# Patient Record
Sex: MALE | Race: WHITE | NOT HISPANIC OR LATINO | Employment: FULL TIME | URBAN - NONMETROPOLITAN AREA
[De-identification: names, ages, dates, MRNs, and addresses within clinical notes are randomized per-mention and may not be internally consistent; named-entity substitution may affect disease eponyms.]

---

## 2024-03-12 ENCOUNTER — OFFICE VISIT (OUTPATIENT)
Dept: URGENT CARE | Facility: CLINIC | Age: 54
End: 2024-03-12
Payer: COMMERCIAL

## 2024-03-12 VITALS — HEIGHT: 72 IN | WEIGHT: 212 LBS | BODY MASS INDEX: 28.71 KG/M2

## 2024-03-12 DIAGNOSIS — K64.9 HEMORRHOIDS, UNSPECIFIED HEMORRHOID TYPE: ICD-10-CM

## 2024-03-12 DIAGNOSIS — K61.2 ANORECTAL ABSCESS: Primary | ICD-10-CM

## 2024-03-12 PROCEDURE — S9088 SERVICES PROVIDED IN URGENT: HCPCS

## 2024-03-12 PROCEDURE — 99213 OFFICE O/P EST LOW 20 MIN: CPT

## 2024-03-12 RX ORDER — TIRZEPATIDE 7.5 MG/.5ML
7.5 INJECTION, SOLUTION SUBCUTANEOUS
Status: ON HOLD | COMMUNITY
Start: 2024-02-16

## 2024-03-12 RX ORDER — PEN NEEDLE, DIABETIC 32GX 5/32"
NEEDLE, DISPOSABLE MISCELLANEOUS
Status: ON HOLD | COMMUNITY
Start: 2023-12-24

## 2024-03-12 RX ORDER — ATORVASTATIN CALCIUM 10 MG/1
10 TABLET, FILM COATED ORAL
Status: ON HOLD | COMMUNITY

## 2024-03-12 RX ORDER — AMLODIPINE BESYLATE 10 MG/1
1 TABLET ORAL DAILY
Status: ON HOLD | COMMUNITY
Start: 2024-02-16

## 2024-03-12 RX ORDER — SULFAMETHOXAZOLE AND TRIMETHOPRIM 800; 160 MG/1; MG/1
1 TABLET ORAL EVERY 12 HOURS SCHEDULED
Qty: 14 TABLET | Refills: 0 | Status: SHIPPED | OUTPATIENT
Start: 2024-03-12 | End: 2024-03-15

## 2024-03-12 RX ORDER — FINERENONE 10 MG/1
1 TABLET, FILM COATED ORAL DAILY
Status: ON HOLD | COMMUNITY

## 2024-03-12 RX ORDER — PIOGLITAZONEHYDROCHLORIDE 30 MG/1
1 TABLET ORAL DAILY
Status: ON HOLD | COMMUNITY
Start: 2023-10-05

## 2024-03-12 RX ORDER — LOSARTAN POTASSIUM 100 MG/1
100 TABLET ORAL DAILY
Status: ON HOLD | COMMUNITY
Start: 2024-02-19

## 2024-03-12 NOTE — PROGRESS NOTES
[facility]        NAME: Nate Todd is a 53 y.o. male  : 1970    MRN: [id number]  DATE: 2024  TIME: 12:19 PM    Assessment and Plan   Anorectal abscess [K61.2]  1. Anorectal abscess  sulfamethoxazole-trimethoprim (BACTRIM DS) 800-160 mg per tablet      2. Hemorrhoids, unspecified hemorrhoid type  hydrocortisone-pramoxine (PROCTOFOAM-HC) 1-1 % FOAM rectal foam    Ambulatory Referral to Colorectal Surgery        Area at rectum appears to be abscess so we will start on antibiotics to fight infection.  Other symptoms seem consistent with internal hemorrhoids so we will also prescribe Proctofoam.  Gave referral to colorectal for removal and follow up.  Went over signs and symptoms of when to proceed to ER.  Advised to increase fiber in diet, plenty of fluids, can use donut to sit for relief, and warm sitz bath. Patient and wife verbalized understanding.    Patient Instructions     Take antibiotic as prescribed   Use cream as needed  Follow up with colorectal   Can use donut to sit on   Warm compress/sitz bath   High fiber diet  Follow up with PCP in 3-5 days.  Proceed to  ER if symptoms worsen.    If tests are performed, our office will contact you with results only if changes need to made to the care plan discussed with you at the visit. You can review your full results on [facility].    Chief Complaint     Chief Complaint   Patient presents with   • abcess     Has a red, raised area near rectum. Stated it is a size of an egg         History of Present Illness       Patient is presenting for a red, raised area near his rectum. He stated it is the size of an egg.  He stated it has been going on for about a week and prior to that he was constipated.  He stated about a week ago he had some bright red blood when he wiped but no more blood.  He denies any drainage from the site.  He stated he feels that when he goes to the bathroom and has some pain with sitting on it.  He denies  any fevers.      Review of Systems   Review of Systems   Constitutional: Negative.    Respiratory: Negative.     Cardiovascular: Negative.    Skin:  Positive for color change (red, tender, swollen lump on R side of rectum).         Current Medications       Current Outpatient Medications:   •  amLODIPine (NORVASC) 10 mg tablet, Take 1 tablet by mouth daily, Disp: , Rfl:   •  atorvastatin (Lipitor) 10 mg tablet, Take 10 mg by mouth daily, Disp: , Rfl:   •  Droplet Pen Needles 32G X 4 MM MISC, use 1 PEN NEEDLE to inject MEDICATION subcutaneously once daily, Disp: , Rfl:   •  Empagliflozin 25 MG TABS, Take 25 mg by mouth daily, Disp: , Rfl:   •  hydrocortisone-pramoxine (PROCTOFOAM-HC) 1-1 % FOAM rectal foam, Insert 1 applicator into the rectum 2 (two) times a day, Disp: 10 g, Rfl: 0  •  Kerendia 10 MG TABS, Take 1 tablet by mouth daily, Disp: , Rfl:   •  losartan (COZAAR) 100 MG tablet, Take 100 mg by mouth daily, Disp: , Rfl:   •  metFORMIN (GLUCOPHAGE) 1000 MG tablet, Take 1 tablet by mouth 2 (two) times a day with meals, Disp: , Rfl:   •  Mounjaro 7.5 MG/0.5ML, 7.5 mg, Disp: , Rfl:   •  pioglitazone (ACTOS) 30 mg tablet, Take 1 tablet by mouth daily, Disp: , Rfl:   •  sulfamethoxazole-trimethoprim (BACTRIM DS) 800-160 mg per tablet, Take 1 tablet by mouth every 12 (twelve) hours for 7 days, Disp: 14 tablet, Rfl: 0    Current Allergies     Allergies as of 03/12/2024 - Reviewed 03/12/2024   Allergen Reaction Noted   • Latex Rash 03/30/2015   • Medical tape Rash 03/30/2015            The following portions of the patient's history were reviewed and updated as appropriate: allergies, current medications, past family history, past medical history, past social history, past surgical history and problem list.     Past Medical History:   Diagnosis Date   • Chronic kidney disease    • Diabetes mellitus (HCC)    • High cholesterol    • Hypertension        Past Surgical History:   Procedure Laterality Date   • ABDOMINAL  AORTIC ANEURYSM REPAIR     • ADENOIDECTOMY     • CHOLECYSTECTOMY     • COLON SIGMOID RESECTION     • COLOSTOMY     • HERNIA REPAIR     • REVISION COLOSTOMY     • TONSILLECTOMY         Family History   Problem Relation Age of Onset   • Heart disease Mother    • Aneurysm Mother    • Irregular heart beat Mother    • Heart disease Father          Medications have been verified.        Objective   Ht 6' (1.829 m)   Wt 96.2 kg (212 lb)   BMI 28.75 kg/m²        Physical Exam     Physical Exam  Exam conducted with a chaperone present.   Constitutional:       Appearance: Normal appearance. He is not ill-appearing or toxic-appearing.   HENT:      Head: Normocephalic.   Cardiovascular:      Rate and Rhythm: Normal rate.      Pulses: Normal pulses.   Pulmonary:      Effort: Pulmonary effort is normal.   Skin:     General: Skin is warm and dry.      Findings: Erythema (abscess at R side of rectum that is tender, red, and raised. No drainage.) present.   Neurological:      General: No focal deficit present.      Mental Status: He is alert and oriented to person, place, and time. Mental status is at baseline.

## 2024-03-12 NOTE — PATIENT INSTRUCTIONS
Take antibiotic as prescribed   Use cream as needed  Follow up with colorectal   Can use donut to sit on   Warm compress/sitz bath   High fiber diet  Follow up with PCP in 3-5 days.  Proceed to  ER if symptoms worsen.    If tests are performed, our office will contact you with results only if changes need to made to the care plan discussed with you at the visit. You can review your full results on [facility].

## 2024-03-14 ENCOUNTER — APPOINTMENT (EMERGENCY)
Dept: CT IMAGING | Facility: HOSPITAL | Age: 54
End: 2024-03-14
Payer: COMMERCIAL

## 2024-03-14 ENCOUNTER — HOSPITAL ENCOUNTER (OUTPATIENT)
Facility: HOSPITAL | Age: 54
Setting detail: OBSERVATION
Discharge: HOME HEALTH | End: 2024-03-15
Attending: EMERGENCY MEDICINE | Admitting: SURGERY
Payer: COMMERCIAL

## 2024-03-14 DIAGNOSIS — E11.59 DM TYPE 2 CAUSING VASCULAR DISEASE (HCC): ICD-10-CM

## 2024-03-14 DIAGNOSIS — I10 ESSENTIAL HYPERTENSION: ICD-10-CM

## 2024-03-14 DIAGNOSIS — K61.1 PERIRECTAL ABSCESS: Primary | ICD-10-CM

## 2024-03-14 DIAGNOSIS — N18.1 STAGE 1 CHRONIC KIDNEY DISEASE: ICD-10-CM

## 2024-03-14 DIAGNOSIS — K61.0 PERIANAL ABSCESS: ICD-10-CM

## 2024-03-14 PROBLEM — G47.33 OBSTRUCTIVE SLEEP APNEA SYNDROME: Status: ACTIVE | Noted: 2017-06-02

## 2024-03-14 PROBLEM — E78.5 HYPERLIPIDEMIA: Status: ACTIVE | Noted: 2018-08-10

## 2024-03-14 PROBLEM — D18.03 HEMANGIOMA OF LIVER: Status: ACTIVE | Noted: 2017-06-02

## 2024-03-14 PROBLEM — I73.9 PAD (PERIPHERAL ARTERY DISEASE) (HCC): Status: ACTIVE | Noted: 2022-12-19

## 2024-03-14 PROBLEM — R93.2 ABNORMAL FINDINGS ON DIAGNOSTIC IMAGING OF LIVER: Status: ACTIVE | Noted: 2024-03-14

## 2024-03-14 PROBLEM — I77.1 ILIAC ARTERY STENOSIS, RIGHT (HCC): Chronic | Status: ACTIVE | Noted: 2018-09-11

## 2024-03-14 PROBLEM — Z87.19 S/P HERNIA REPAIR: Status: ACTIVE | Noted: 2018-08-10

## 2024-03-14 PROBLEM — Z98.890 S/P HERNIA REPAIR: Status: ACTIVE | Noted: 2018-08-10

## 2024-03-14 LAB
ALBUMIN SERPL BCP-MCNC: 4.1 G/DL (ref 3.5–5)
ALP SERPL-CCNC: 91 U/L (ref 34–104)
ALT SERPL W P-5'-P-CCNC: 11 U/L (ref 7–52)
ANION GAP SERPL CALCULATED.3IONS-SCNC: 11 MMOL/L (ref 4–13)
APTT PPP: 28 SECONDS (ref 23–37)
AST SERPL W P-5'-P-CCNC: 9 U/L (ref 13–39)
BASOPHILS # BLD AUTO: 0.04 THOUSANDS/ÂΜL (ref 0–0.1)
BASOPHILS NFR BLD AUTO: 0 % (ref 0–1)
BILIRUB SERPL-MCNC: 0.52 MG/DL (ref 0.2–1)
BUN SERPL-MCNC: 17 MG/DL (ref 5–25)
CALCIUM SERPL-MCNC: 9.4 MG/DL (ref 8.4–10.2)
CHLORIDE SERPL-SCNC: 98 MMOL/L (ref 96–108)
CO2 SERPL-SCNC: 25 MMOL/L (ref 21–32)
CREAT SERPL-MCNC: 1.03 MG/DL (ref 0.6–1.3)
EOSINOPHIL # BLD AUTO: 0.08 THOUSAND/ÂΜL (ref 0–0.61)
EOSINOPHIL NFR BLD AUTO: 1 % (ref 0–6)
ERYTHROCYTE [DISTWIDTH] IN BLOOD BY AUTOMATED COUNT: 12.6 % (ref 11.6–15.1)
GFR SERPL CREATININE-BSD FRML MDRD: 81 ML/MIN/1.73SQ M
GLUCOSE SERPL-MCNC: 235 MG/DL (ref 65–140)
HCT VFR BLD AUTO: 44.4 % (ref 36.5–49.3)
HGB BLD-MCNC: 15.5 G/DL (ref 12–17)
IMM GRANULOCYTES # BLD AUTO: 0.08 THOUSAND/UL (ref 0–0.2)
IMM GRANULOCYTES NFR BLD AUTO: 1 % (ref 0–2)
INR PPP: 0.95 (ref 0.84–1.19)
LACTATE SERPL-SCNC: 1.6 MMOL/L (ref 0.5–2)
LYMPHOCYTES # BLD AUTO: 2.41 THOUSANDS/ÂΜL (ref 0.6–4.47)
LYMPHOCYTES NFR BLD AUTO: 16 % (ref 14–44)
MCH RBC QN AUTO: 30.2 PG (ref 26.8–34.3)
MCHC RBC AUTO-ENTMCNC: 34.9 G/DL (ref 31.4–37.4)
MCV RBC AUTO: 87 FL (ref 82–98)
MONOCYTES # BLD AUTO: 0.97 THOUSAND/ÂΜL (ref 0.17–1.22)
MONOCYTES NFR BLD AUTO: 7 % (ref 4–12)
NEUTROPHILS # BLD AUTO: 11.13 THOUSANDS/ÂΜL (ref 1.85–7.62)
NEUTS SEG NFR BLD AUTO: 75 % (ref 43–75)
NRBC BLD AUTO-RTO: 0 /100 WBCS
PLATELET # BLD AUTO: 289 THOUSANDS/UL (ref 149–390)
PLATELET # BLD AUTO: 303 THOUSANDS/UL (ref 149–390)
PMV BLD AUTO: 8.5 FL (ref 8.9–12.7)
PMV BLD AUTO: 8.8 FL (ref 8.9–12.7)
POTASSIUM SERPL-SCNC: 3.9 MMOL/L (ref 3.5–5.3)
PROT SERPL-MCNC: 8.4 G/DL (ref 6.4–8.4)
PROTHROMBIN TIME: 13 SECONDS (ref 11.6–14.5)
RBC # BLD AUTO: 5.13 MILLION/UL (ref 3.88–5.62)
SODIUM SERPL-SCNC: 134 MMOL/L (ref 135–147)
WBC # BLD AUTO: 14.71 THOUSAND/UL (ref 4.31–10.16)

## 2024-03-14 PROCEDURE — 99285 EMERGENCY DEPT VISIT HI MDM: CPT | Performed by: EMERGENCY MEDICINE

## 2024-03-14 PROCEDURE — 87070 CULTURE OTHR SPECIMN AEROBIC: CPT | Performed by: SURGERY

## 2024-03-14 PROCEDURE — 85730 THROMBOPLASTIN TIME PARTIAL: CPT | Performed by: EMERGENCY MEDICINE

## 2024-03-14 PROCEDURE — 96375 TX/PRO/DX INJ NEW DRUG ADDON: CPT

## 2024-03-14 PROCEDURE — 87185 SC STD ENZYME DETCJ PER NZM: CPT | Performed by: SURGERY

## 2024-03-14 PROCEDURE — 87040 BLOOD CULTURE FOR BACTERIA: CPT | Performed by: EMERGENCY MEDICINE

## 2024-03-14 PROCEDURE — 87205 SMEAR GRAM STAIN: CPT | Performed by: SURGERY

## 2024-03-14 PROCEDURE — 99222 1ST HOSP IP/OBS MODERATE 55: CPT | Performed by: SURGERY

## 2024-03-14 PROCEDURE — 74177 CT ABD & PELVIS W/CONTRAST: CPT

## 2024-03-14 PROCEDURE — 36415 COLL VENOUS BLD VENIPUNCTURE: CPT | Performed by: EMERGENCY MEDICINE

## 2024-03-14 PROCEDURE — 80053 COMPREHEN METABOLIC PANEL: CPT | Performed by: EMERGENCY MEDICINE

## 2024-03-14 PROCEDURE — 85025 COMPLETE CBC W/AUTO DIFF WBC: CPT | Performed by: EMERGENCY MEDICINE

## 2024-03-14 PROCEDURE — 96365 THER/PROPH/DIAG IV INF INIT: CPT

## 2024-03-14 PROCEDURE — 96361 HYDRATE IV INFUSION ADD-ON: CPT

## 2024-03-14 PROCEDURE — 85049 AUTOMATED PLATELET COUNT: CPT | Performed by: SURGERY

## 2024-03-14 PROCEDURE — 85610 PROTHROMBIN TIME: CPT | Performed by: EMERGENCY MEDICINE

## 2024-03-14 PROCEDURE — 46040 I&D ISCHIORCT&/PERIRCT ABSC: CPT | Performed by: SURGERY

## 2024-03-14 PROCEDURE — 83605 ASSAY OF LACTIC ACID: CPT | Performed by: EMERGENCY MEDICINE

## 2024-03-14 PROCEDURE — 99284 EMERGENCY DEPT VISIT MOD MDM: CPT

## 2024-03-14 RX ORDER — HYDROMORPHONE HCL/PF 1 MG/ML
0.5 SYRINGE (ML) INJECTION ONCE
Status: COMPLETED | OUTPATIENT
Start: 2024-03-14 | End: 2024-03-14

## 2024-03-14 RX ORDER — ATORVASTATIN CALCIUM 10 MG/1
10 TABLET, FILM COATED ORAL DAILY
Status: DISCONTINUED | OUTPATIENT
Start: 2024-03-15 | End: 2024-03-15 | Stop reason: HOSPADM

## 2024-03-14 RX ORDER — HEPARIN SODIUM 5000 [USP'U]/ML
5000 INJECTION, SOLUTION INTRAVENOUS; SUBCUTANEOUS EVERY 8 HOURS SCHEDULED
Status: DISCONTINUED | OUTPATIENT
Start: 2024-03-14 | End: 2024-03-15 | Stop reason: HOSPADM

## 2024-03-14 RX ORDER — AMLODIPINE BESYLATE 5 MG/1
10 TABLET ORAL DAILY
Status: DISCONTINUED | OUTPATIENT
Start: 2024-03-15 | End: 2024-03-15 | Stop reason: HOSPADM

## 2024-03-14 RX ORDER — OXYCODONE HYDROCHLORIDE AND ACETAMINOPHEN 5; 325 MG/1; MG/1
2 TABLET ORAL EVERY 4 HOURS PRN
Status: DISCONTINUED | OUTPATIENT
Start: 2024-03-14 | End: 2024-03-15 | Stop reason: HOSPADM

## 2024-03-14 RX ORDER — SODIUM CHLORIDE, SODIUM LACTATE, POTASSIUM CHLORIDE, CALCIUM CHLORIDE 600; 310; 30; 20 MG/100ML; MG/100ML; MG/100ML; MG/100ML
125 INJECTION, SOLUTION INTRAVENOUS CONTINUOUS
Status: DISCONTINUED | OUTPATIENT
Start: 2024-03-14 | End: 2024-03-15 | Stop reason: HOSPADM

## 2024-03-14 RX ORDER — LOSARTAN POTASSIUM 50 MG/1
100 TABLET ORAL DAILY
Status: DISCONTINUED | OUTPATIENT
Start: 2024-03-15 | End: 2024-03-15 | Stop reason: HOSPADM

## 2024-03-14 RX ORDER — LIDOCAINE HYDROCHLORIDE AND EPINEPHRINE 10; 10 MG/ML; UG/ML
60 INJECTION, SOLUTION INFILTRATION; PERINEURAL ONCE
Status: COMPLETED | OUTPATIENT
Start: 2024-03-14 | End: 2024-03-14

## 2024-03-14 RX ADMIN — LIDOCAINE HYDROCHLORIDE,EPINEPHRINE BITARTRATE 60 ML: 10; .01 INJECTION, SOLUTION INFILTRATION; PERINEURAL at 21:08

## 2024-03-14 RX ADMIN — SODIUM CHLORIDE 1000 ML: 0.9 INJECTION, SOLUTION INTRAVENOUS at 17:34

## 2024-03-14 RX ADMIN — HYDROMORPHONE HYDROCHLORIDE 0.5 MG: 1 INJECTION, SOLUTION INTRAMUSCULAR; INTRAVENOUS; SUBCUTANEOUS at 20:33

## 2024-03-14 RX ADMIN — PIPERACILLIN AND TAZOBACTAM 4.5 G: 36; 4.5 INJECTION, POWDER, FOR SOLUTION INTRAVENOUS at 22:40

## 2024-03-14 RX ADMIN — SODIUM CHLORIDE, SODIUM LACTATE, POTASSIUM CHLORIDE, AND CALCIUM CHLORIDE 125 ML/HR: .6; .31; .03; .02 INJECTION, SOLUTION INTRAVENOUS at 22:41

## 2024-03-14 RX ADMIN — HEPARIN SODIUM 5000 UNITS: 5000 INJECTION INTRAVENOUS; SUBCUTANEOUS at 22:41

## 2024-03-14 RX ADMIN — IOHEXOL 100 ML: 350 INJECTION, SOLUTION INTRAVENOUS at 18:32

## 2024-03-14 RX ADMIN — OXYCODONE HYDROCHLORIDE AND ACETAMINOPHEN 2 TABLET: 5; 325 TABLET ORAL at 22:40

## 2024-03-14 RX ADMIN — PIPERACILLIN AND TAZOBACTAM 4.5 G: 36; 4.5 INJECTION, POWDER, FOR SOLUTION INTRAVENOUS at 20:00

## 2024-03-14 NOTE — Clinical Note
Case was discussed with Dr Moran and the patient's admission status was agreed to be Admission Status: inpatient status to the service of Dr. Moran .

## 2024-03-14 NOTE — ED PROVIDER NOTES
History  Chief Complaint   Patient presents with    Abscess     Pt reports having an anorectal abscess for a little over a week. Went to Urgent care and got a topical cream and antibiotics but pt is having an increase in pain     Patient is a 53-year-old male presenting to the emergency department complaining of painful erythematous swelling to his perirectal area that is been going on for the past week, he reports a fever developing today as well, was seen at urgent care 2 days ago and started on Bactrim and Proctofoam topical cream, reports worsening of symptoms, no nausea vomiting or diarrhea, no chest pain or shortness of breath, has a history of abscesses on his back previously but not in the perirectal area        Prior to Admission Medications   Prescriptions Last Dose Informant Patient Reported? Taking?   Droplet Pen Needles 32G X 4 MM MISC   Yes No   Sig: use 1 PEN NEEDLE to inject MEDICATION subcutaneously once daily   Empagliflozin 25 MG TABS   Yes No   Sig: Take 25 mg by mouth daily   Kerendia 10 MG TABS   Yes No   Sig: Take 1 tablet by mouth daily   Mounjaro 7.5 MG/0.5ML   Yes No   Si.5 mg   amLODIPine (NORVASC) 10 mg tablet   Yes No   Sig: Take 1 tablet by mouth daily   atorvastatin (Lipitor) 10 mg tablet   Yes No   Sig: Take 10 mg by mouth daily   hydrocortisone-pramoxine (PROCTOFOAM-HC) 1-1 % FOAM rectal foam   No No   Sig: Insert 1 applicator into the rectum 2 (two) times a day   losartan (COZAAR) 100 MG tablet   Yes No   Sig: Take 100 mg by mouth daily   metFORMIN (GLUCOPHAGE) 1000 MG tablet   Yes No   Sig: Take 1 tablet by mouth 2 (two) times a day with meals   pioglitazone (ACTOS) 30 mg tablet   Yes No   Sig: Take 1 tablet by mouth daily   sulfamethoxazole-trimethoprim (BACTRIM DS) 800-160 mg per tablet   No No   Sig: Take 1 tablet by mouth every 12 (twelve) hours for 7 days      Facility-Administered Medications: None       Past Medical History:   Diagnosis Date    Chronic kidney disease      Diabetes mellitus (HCC)     High cholesterol     Hypertension        Past Surgical History:   Procedure Laterality Date    ABDOMINAL AORTIC ANEURYSM REPAIR      ADENOIDECTOMY      CHOLECYSTECTOMY      COLON SIGMOID RESECTION      COLOSTOMY      HERNIA REPAIR      REVISION COLOSTOMY      TONSILLECTOMY         Family History   Problem Relation Age of Onset    Heart disease Mother     Aneurysm Mother     Irregular heart beat Mother     Heart disease Father      I have reviewed and agree with the history as documented.    E-Cigarette/Vaping     E-Cigarette/Vaping Substances     Social History     Tobacco Use    Smoking status: Every Day     Current packs/day: 1.00     Types: Cigarettes     Passive exposure: Current    Smokeless tobacco: Never   Substance Use Topics    Alcohol use: Not Currently    Drug use: Not Currently       Review of Systems   Constitutional:  Positive for chills and fever.   HENT: Negative.     Eyes: Negative.    Respiratory: Negative.     Cardiovascular: Negative.    Gastrointestinal:  Positive for rectal pain.   Endocrine: Negative.    Genitourinary: Negative.    Musculoskeletal: Negative.    Skin: Negative.    Allergic/Immunologic: Negative.    Neurological: Negative.    Hematological: Negative.    Psychiatric/Behavioral: Negative.         Physical Exam  Physical Exam  Constitutional:       Appearance: He is well-developed.   HENT:      Head: Normocephalic and atraumatic.   Eyes:      Conjunctiva/sclera: Conjunctivae normal.      Pupils: Pupils are equal, round, and reactive to light.   Cardiovascular:      Rate and Rhythm: Normal rate.   Pulmonary:      Effort: Pulmonary effort is normal.   Abdominal:      Palpations: Abdomen is soft.   Genitourinary:         Comments: Large erythematous tender indurated fluctuant abscess to the perineum  Musculoskeletal:         General: Normal range of motion.      Cervical back: Normal range of motion and neck supple.   Skin:     General: Skin is warm and  dry.   Neurological:      Mental Status: He is alert and oriented to person, place, and time.         Vital Signs  ED Triage Vitals   Temperature Pulse Respirations Blood Pressure SpO2   03/14/24 1629 03/14/24 1626 03/14/24 1626 03/14/24 1626 03/14/24 1626   100.1 °F (37.8 °C) (!) 116 18 133/73 97 %      Temp Source Heart Rate Source Patient Position - Orthostatic VS BP Location FiO2 (%)   03/14/24 1629 03/14/24 1626 03/14/24 1626 03/14/24 1626 --   Oral Monitor Sitting Right arm       Pain Score       03/14/24 2033       6           Vitals:    03/14/24 1626 03/14/24 2045   BP: 133/73 124/77   Pulse: (!) 116 100   Patient Position - Orthostatic VS: Sitting Lying         Visual Acuity      ED Medications  Medications   amLODIPine (NORVASC) tablet 10 mg (has no administration in time range)   atorvastatin (LIPITOR) tablet 10 mg (has no administration in time range)   Empagliflozin TABS 25 mg (has no administration in time range)   Finerenone TABS 1 tablet (has no administration in time range)   losartan (COZAAR) tablet 100 mg (has no administration in time range)   metFORMIN (GLUCOPHAGE) tablet 1,000 mg (has no administration in time range)   lactated ringers infusion (has no administration in time range)   heparin (porcine) subcutaneous injection 5,000 Units (has no administration in time range)   piperacillin-tazobactam (ZOSYN) 4.5 g in sodium chloride 0.9 % 100 mL IV LOADING DOSE (has no administration in time range)     And   piperacillin-tazobactam (ZOSYN) 4.5 g in sodium chloride 0.9 % 100 mL IVPB (EXTENDED INFUSION) (has no administration in time range)   oxyCODONE-acetaminophen (PERCOCET) 5-325 mg per tablet 2 tablet (has no administration in time range)   morphine injection 2 mg (has no administration in time range)   sodium chloride 0.9 % bolus 1,000 mL (0 mL Intravenous Stopped 3/14/24 1834)   HYDROmorphone (DILAUDID) injection 0.5 mg (0.5 mg Intravenous Given 3/14/24 2033)   iohexol (OMNIPAQUE) 350 MG/ML  injection (MULTI-DOSE) 100 mL (100 mL Intravenous Given 3/14/24 1832)   piperacillin-tazobactam (ZOSYN) 4.5 g in sodium chloride 0.9 % 100 mL IVPB (0 g Intravenous Stopped 3/14/24 2032)   lidocaine-epinephrine (XYLOCAINE/EPINEPHRINE) 1 %-1:100,000 injection 60 mL (60 mL Infiltration Given 3/14/24 2108)       Diagnostic Studies  Results Reviewed       Procedure Component Value Units Date/Time    Platelet count [[id number]]     Lab Status: No result Specimen: Blood     Wound culture and Gram stain [[id number]] Collected: 03/14/24 2109    Lab Status: In process Specimen: Wound from Sacrum Updated: 03/14/24 2116    Lactic acid, plasma (w/reflex if result > 2.0) [[id number]]  (Normal) Collected: 03/14/24 1730    Lab Status: Final result Specimen: Blood from Arm, Right Updated: 03/14/24 1811     LACTIC ACID 1.6 mmol/L     Narrative:      Result may be elevated if tourniquet was used during collection.    Comprehensive metabolic panel [[id number]]  (Abnormal) Collected: 03/14/24 1730    Lab Status: Final result Specimen: Blood from Arm, Right Updated: 03/14/24 1810     Sodium 134 mmol/L      Potassium 3.9 mmol/L      Chloride 98 mmol/L      CO2 25 mmol/L      ANION GAP 11 mmol/L      BUN 17 mg/dL      Creatinine 1.03 mg/dL      Glucose 235 mg/dL      Calcium 9.4 mg/dL      AST 9 U/L      ALT 11 U/L      Alkaline Phosphatase 91 U/L      Total Protein 8.4 g/dL      Albumin 4.1 g/dL      Total Bilirubin 0.52 mg/dL      eGFR 81 ml/min/1.73sq m     Narrative:      National Kidney Disease Foundation guidelines for Chronic Kidney Disease (CKD):     Stage 1 with normal or high GFR (GFR > 90 mL/min/1.73 square meters)    Stage 2 Mild CKD (GFR = 60-89 mL/min/1.73 square meters)    Stage 3A Moderate CKD (GFR = 45-59 mL/min/1.73 square meters)    Stage 3B Moderate CKD (GFR = 30-44 mL/min/1.73 square meters)    Stage 4 Severe CKD (GFR = 15-29 mL/min/1.73 square meters)    Stage 5 End Stage CKD (GFR <15 mL/min/1.73 square meters)  Note: GFR  calculation is accurate only with a steady state creatinine    Protime-INR [[id number]]  (Normal) Collected: 03/14/24 1730    Lab Status: Final result Specimen: Blood from Arm, Right Updated: 03/14/24 1801     Protime 13.0 seconds      INR 0.95    APTT [[id number]]  (Normal) Collected: 03/14/24 1730    Lab Status: Final result Specimen: Blood from Arm, Right Updated: 03/14/24 1801     PTT 28 seconds     CBC and differential [[id number]]  (Abnormal) Collected: 03/14/24 1730    Lab Status: Final result Specimen: Blood from Arm, Right Updated: 03/14/24 1741     WBC 14.71 Thousand/uL      RBC 5.13 Million/uL      Hemoglobin 15.5 g/dL      Hematocrit 44.4 %      MCV 87 fL      MCH 30.2 pg      MCHC 34.9 g/dL      RDW 12.6 %      MPV 8.5 fL      Platelets 289 Thousands/uL      nRBC 0 /100 WBCs      Neutrophils Relative 75 %      Immature Grans % 1 %      Lymphocytes Relative 16 %      Monocytes Relative 7 %      Eosinophils Relative 1 %      Basophils Relative 0 %      Neutrophils Absolute 11.13 Thousands/µL      Absolute Immature Grans 0.08 Thousand/uL      Absolute Lymphocytes 2.41 Thousands/µL      Absolute Monocytes 0.97 Thousand/µL      Eosinophils Absolute 0.08 Thousand/µL      Basophils Absolute 0.04 Thousands/µL     Blood culture #1 [[id number]] Collected: 03/14/24 1730    Lab Status: In process Specimen: Blood from Arm, Left Updated: 03/14/24 1738    Blood culture #2 [[id number]] Collected: 03/14/24 1730    Lab Status: In process Specimen: Blood from Arm, Right Updated: 03/14/24 1738                   CT abdomen pelvis with contrast   Final Result by Doroteo Thakkar MD (03/14 1923)      Perianal abscess appears to involve the right medial gluteal fold measuring 2.9 x 4.7 x 5.6 cm. Cellulitic changes are seen adjacent to the abscess.      Postsurgical changes of the abdominal aorta and common iliac arteries as described with stable appearance.      Enhancing lesion in the posterior right liver dome measuring 3.5 cm not  seen previously. Further evaluation/characterization with MRI recommended.            The study was marked in EPIC for immediate notification and MRI follow-up notification for 2 months.         Workstation performed: [id number]                    Procedures  Procedures         ED Course  ED Course as of 03/14/24 2137   Thu Mar 14, 2024   1927 CT abdomen pelvis with contrast   1947 [place] text to surgery, will come see patient                                             Medical Decision Making  Patient presents with initial presentation for local erythema, warmth and swelling concerning for perirectal abscess.  There is sensitivity/slight tenderness to light touch around the erythematous area.   Low concern for osteomyelitis or DVT.  No immune compromise, bullae, pain out of proportion, or rapid progression concerning for necrotizing fasciitis.  Patient with leukocytosis and elevated temp, discussed with surgery who came to see, performed bedside I&D and will admit for IV antibiotics and further treatment    Problems Addressed:  Perianal abscess: acute illness or injury    Amount and/or Complexity of Data Reviewed  Labs: ordered. Decision-making details documented in ED Course.  Radiology: ordered. Decision-making details documented in ED Course.    Risk  Prescription drug management.  Decision regarding hospitalization.             Disposition  Final diagnoses:   Perianal abscess     Time reflects when diagnosis was documented in both MDM as applicable and the Disposition within this note       Time User Action Codes Description Comment    3/14/2024  9:18 PM Curt Moran [K61.1] Perirectal abscess     3/14/2024  9:23 PM Curt Moran [E11.59] DM type 2 causing vascular disease (HCC)     3/14/2024  9:23 PM Curt Moran [I10] Essential hypertension     3/14/2024  9:23 PM Curt Moran [N18.1] Stage 1 chronic kidney disease     3/14/2024  9:32 PM Mari Friedman [K61.0] Perianal  abscess           ED Disposition       ED Disposition   Admit    Condition   Stable    Date/Time   Thu Mar 14, 2024  9:33 PM    Comment   Case was discussed with Dr Moran and the patient's admission status was agreed to be Observation Status: observation status to the service of Dr. Moran .               Follow-up Information    None         Patient's Medications   Discharge Prescriptions    No medications on file       No discharge procedures on file.    PDMP Review       None            ED Provider  Electronically Signed by             Mari Friedman DO  03/14/24 8705

## 2024-03-15 VITALS
HEIGHT: 72 IN | BODY MASS INDEX: 28.71 KG/M2 | SYSTOLIC BLOOD PRESSURE: 130 MMHG | OXYGEN SATURATION: 96 % | WEIGHT: 212 LBS | DIASTOLIC BLOOD PRESSURE: 76 MMHG | HEART RATE: 87 BPM | TEMPERATURE: 97.9 F | RESPIRATION RATE: 18 BRPM

## 2024-03-15 LAB
ANION GAP SERPL CALCULATED.3IONS-SCNC: 7 MMOL/L (ref 4–13)
BASOPHILS # BLD AUTO: 0.05 THOUSANDS/ÂΜL (ref 0–0.1)
BASOPHILS NFR BLD AUTO: 0 % (ref 0–1)
BUN SERPL-MCNC: 17 MG/DL (ref 5–25)
CALCIUM SERPL-MCNC: 8.4 MG/DL (ref 8.4–10.2)
CHLORIDE SERPL-SCNC: 104 MMOL/L (ref 96–108)
CO2 SERPL-SCNC: 24 MMOL/L (ref 21–32)
CREAT SERPL-MCNC: 1.04 MG/DL (ref 0.6–1.3)
EOSINOPHIL # BLD AUTO: 0.02 THOUSAND/ÂΜL (ref 0–0.61)
EOSINOPHIL NFR BLD AUTO: 0 % (ref 0–6)
ERYTHROCYTE [DISTWIDTH] IN BLOOD BY AUTOMATED COUNT: 12.7 % (ref 11.6–15.1)
GFR SERPL CREATININE-BSD FRML MDRD: 80 ML/MIN/1.73SQ M
GLUCOSE P FAST SERPL-MCNC: 200 MG/DL (ref 65–99)
GLUCOSE SERPL-MCNC: 172 MG/DL (ref 65–140)
GLUCOSE SERPL-MCNC: 200 MG/DL (ref 65–140)
GLUCOSE SERPL-MCNC: 207 MG/DL (ref 65–140)
HCT VFR BLD AUTO: 39.3 % (ref 36.5–49.3)
HGB BLD-MCNC: 13.4 G/DL (ref 12–17)
IMM GRANULOCYTES # BLD AUTO: 0.09 THOUSAND/UL (ref 0–0.2)
IMM GRANULOCYTES NFR BLD AUTO: 1 % (ref 0–2)
LYMPHOCYTES # BLD AUTO: 1.28 THOUSANDS/ÂΜL (ref 0.6–4.47)
LYMPHOCYTES NFR BLD AUTO: 7 % (ref 14–44)
MAGNESIUM SERPL-MCNC: 2 MG/DL (ref 1.9–2.7)
MCH RBC QN AUTO: 30.5 PG (ref 26.8–34.3)
MCHC RBC AUTO-ENTMCNC: 34.1 G/DL (ref 31.4–37.4)
MCV RBC AUTO: 90 FL (ref 82–98)
MONOCYTES # BLD AUTO: 1.16 THOUSAND/ÂΜL (ref 0.17–1.22)
MONOCYTES NFR BLD AUTO: 7 % (ref 4–12)
NEUTROPHILS # BLD AUTO: 14.85 THOUSANDS/ÂΜL (ref 1.85–7.62)
NEUTS SEG NFR BLD AUTO: 85 % (ref 43–75)
NRBC BLD AUTO-RTO: 0 /100 WBCS
PHOSPHATE SERPL-MCNC: 4 MG/DL (ref 2.7–4.5)
PLATELET # BLD AUTO: 220 THOUSANDS/UL (ref 149–390)
PMV BLD AUTO: 8.7 FL (ref 8.9–12.7)
POTASSIUM SERPL-SCNC: 4.3 MMOL/L (ref 3.5–5.3)
RBC # BLD AUTO: 4.39 MILLION/UL (ref 3.88–5.62)
SODIUM SERPL-SCNC: 135 MMOL/L (ref 135–147)
WBC # BLD AUTO: 17.45 THOUSAND/UL (ref 4.31–10.16)

## 2024-03-15 PROCEDURE — 84100 ASSAY OF PHOSPHORUS: CPT | Performed by: SURGERY

## 2024-03-15 PROCEDURE — 80048 BASIC METABOLIC PNL TOTAL CA: CPT | Performed by: SURGERY

## 2024-03-15 PROCEDURE — 99222 1ST HOSP IP/OBS MODERATE 55: CPT | Performed by: INTERNAL MEDICINE

## 2024-03-15 PROCEDURE — 85025 COMPLETE CBC W/AUTO DIFF WBC: CPT | Performed by: SURGERY

## 2024-03-15 PROCEDURE — 99222 1ST HOSP IP/OBS MODERATE 55: CPT

## 2024-03-15 PROCEDURE — 83735 ASSAY OF MAGNESIUM: CPT | Performed by: SURGERY

## 2024-03-15 PROCEDURE — 82948 REAGENT STRIP/BLOOD GLUCOSE: CPT

## 2024-03-15 RX ORDER — INSULIN LISPRO 100 [IU]/ML
1-6 INJECTION, SOLUTION INTRAVENOUS; SUBCUTANEOUS
Status: DISCONTINUED | OUTPATIENT
Start: 2024-03-15 | End: 2024-03-15 | Stop reason: HOSPADM

## 2024-03-15 RX ORDER — OXYCODONE HYDROCHLORIDE AND ACETAMINOPHEN 5; 325 MG/1; MG/1
2 TABLET ORAL EVERY 6 HOURS PRN
Qty: 12 TABLET | Refills: 0 | Status: SHIPPED | OUTPATIENT
Start: 2024-03-15 | End: 2024-03-27

## 2024-03-15 RX ORDER — AMOXICILLIN AND CLAVULANATE POTASSIUM 875; 125 MG/1; MG/1
1 TABLET, FILM COATED ORAL EVERY 12 HOURS SCHEDULED
Qty: 14 TABLET | Refills: 0 | Status: ON HOLD | OUTPATIENT
Start: 2024-03-15 | End: 2024-03-25

## 2024-03-15 RX ADMIN — INSULIN LISPRO 1 UNITS: 100 INJECTION, SOLUTION INTRAVENOUS; SUBCUTANEOUS at 08:49

## 2024-03-15 RX ADMIN — HEPARIN SODIUM 5000 UNITS: 5000 INJECTION INTRAVENOUS; SUBCUTANEOUS at 13:24

## 2024-03-15 RX ADMIN — LOSARTAN POTASSIUM 100 MG: 50 TABLET, FILM COATED ORAL at 08:48

## 2024-03-15 RX ADMIN — INSULIN LISPRO 2 UNITS: 100 INJECTION, SOLUTION INTRAVENOUS; SUBCUTANEOUS at 11:31

## 2024-03-15 RX ADMIN — PIPERACILLIN AND TAZOBACTAM 4.5 G: 36; 4.5 INJECTION, POWDER, FOR SOLUTION INTRAVENOUS at 02:06

## 2024-03-15 RX ADMIN — MORPHINE SULFATE 2 MG: 2 INJECTION, SOLUTION INTRAMUSCULAR; INTRAVENOUS at 14:56

## 2024-03-15 RX ADMIN — ATORVASTATIN CALCIUM 10 MG: 10 TABLET, FILM COATED ORAL at 08:48

## 2024-03-15 RX ADMIN — HEPARIN SODIUM 5000 UNITS: 5000 INJECTION INTRAVENOUS; SUBCUTANEOUS at 05:11

## 2024-03-15 RX ADMIN — AMLODIPINE BESYLATE 10 MG: 5 TABLET ORAL at 08:48

## 2024-03-15 RX ADMIN — PIPERACILLIN AND TAZOBACTAM 4.5 G: 36; 4.5 INJECTION, POWDER, FOR SOLUTION INTRAVENOUS at 11:16

## 2024-03-15 NOTE — NURSING NOTE
AVS reviewed with patient. Patient verbalized understanding on same. Wound care education provided. [facility] will be in tomorrow to do wound care. Wound care completed by provider today.

## 2024-03-15 NOTE — H&P
H&P Exam - General Surgery   Nate Todd 53 y.o. male MRN: [id number]  Unit/Bed#: ED 10 Encounter: [id number]    Assessment/Plan     Assessment:  Pleasant 53-year-old male with a past medical history significant for hypertension and type 2 diabetes mellitus presenting with a perirectal abscess for which incision and drainage is now indicated.    Plan:  Technical details of bedside incision and drainage of perirectal abscess reviewed and informed verbal consent obtained to proceed.    The incidental finding of a liver lesion was noted with the patient and wife voiced understanding that additional testing following discharge will be necessary.    History of Present Illness   HPI:  Nate Todd is a 53 y.o. male who presents with perirectal abscess.    Review of Systems   Constitutional:  Negative for chills and fever.   HENT:  Negative for ear pain and sore throat.    Eyes:  Negative for pain and visual disturbance.   Respiratory:  Negative for cough and shortness of breath.    Cardiovascular:  Negative for chest pain and palpitations.   Gastrointestinal:  Negative for abdominal pain and vomiting.   Genitourinary:  Negative for dysuria and hematuria.   Musculoskeletal:  Negative for arthralgias and back pain.   Skin:  Negative for color change and rash.   Neurological:  Negative for seizures and syncope.   All other systems reviewed and are negative.      Historical Information   Past Medical History:   Diagnosis Date    Chronic kidney disease     Diabetes mellitus (HCC)     High cholesterol     Hypertension      Past Surgical History:   Procedure Laterality Date    ABDOMINAL AORTIC ANEURYSM REPAIR      ADENOIDECTOMY      CHOLECYSTECTOMY      COLON SIGMOID RESECTION      COLOSTOMY      HERNIA REPAIR      REVISION COLOSTOMY      TONSILLECTOMY       Social History   Social History     Substance and Sexual Activity   Alcohol Use Not Currently     Social History     Substance and Sexual Activity   Drug Use  "Not Currently     Social History     Tobacco Use   Smoking Status Every Day    Current packs/day: 1.00    Types: Cigarettes    Passive exposure: Current   Smokeless Tobacco Never     E-Cigarette/Vaping     E-Cigarette/Vaping Substances     Family History: non-contributory    Meds/Allergies   all medications and allergies reviewed  Allergies   Allergen Reactions    Latex Rash     \"BLISTER\"    Medical Tape Rash     \"Redness,blister  & swells\"       Objective   First Vitals:   Blood Pressure: 133/73 (03/14/24 1626)  Pulse: (!) 116 (03/14/24 1626)  Temperature: 100.1 °F (37.8 °C) (03/14/24 1629)  Temp Source: Oral (03/14/24 1629)  Respirations: 18 (03/14/24 1626)  Height: 6' (182.9 cm) (03/14/24 1626)  Weight - Scale: 96.2 kg (212 lb) (03/14/24 1626)  SpO2: 97 % (03/14/24 1626)    Current Vitals:   Blood Pressure: 124/77 (03/14/24 2045)  Pulse: 100 (03/14/24 2045)  Temperature: 100.1 °F (37.8 °C) (03/14/24 1629)  Temp Source: Oral (03/14/24 1629)  Respirations: 18 (03/14/24 2045)  Height: 6' (182.9 cm) (03/14/24 1626)  Weight - Scale: 96.2 kg (212 lb) (03/14/24 1626)  SpO2: 94 % (03/14/24 2045)    No intake or output data in the 24 hours ending 03/14/24 2114    Invasive Devices       Peripheral Intravenous Line  Duration             Peripheral IV 03/14/24 Right;Ventral (anterior) Forearm <1 day                    Physical Exam  Vitals and nursing note reviewed.   Constitutional:       General: He is not in acute distress.     Appearance: He is well-developed.   HENT:      Head: Normocephalic and atraumatic.   Eyes:      Conjunctiva/sclera: Conjunctivae normal.   Cardiovascular:      Rate and Rhythm: Normal rate and regular rhythm.      Heart sounds: No murmur heard.  Pulmonary:      Effort: Pulmonary effort is normal. No respiratory distress.      Breath sounds: Normal breath sounds.   Abdominal:      Palpations: Abdomen is soft.      Tenderness: There is no abdominal tenderness.   Genitourinary:     Comments: " Perirectal abscess present right anterior quadrant  Musculoskeletal:         General: No swelling.      Cervical back: Neck supple.   Skin:     General: Skin is warm and dry.      Capillary Refill: Capillary refill takes less than 2 seconds.   Neurological:      Mental Status: He is alert.   Psychiatric:         Mood and Affect: Mood normal.         Lab Results: I have personally reviewed pertinent lab results.    Imaging: I have personally reviewed pertinent reports.    EKG, Pathology, and Other Studies: I have personally reviewed pertinent reports.      Code Status: No Order  Advance Directive and Living Will:      Power of :    POLST:      Counseling / Coordination of Care  Total floor / unit time spent today 35 minutes.  Greater than 50% of total time was spent with the patient and / or family counseling and / or coordination of care.  A description of the counseling / coordination of care: 15.

## 2024-03-15 NOTE — PROCEDURES
"Incision and drain    Date/Time: 3/14/2024 9:16 PM    Performed by: Curt Moran MD  Authorized by: Curt Moran MD  Universal Protocol:  Consent: Verbal consent obtained.  Risks and benefits: risks, benefits and alternatives were discussed  Consent given by: patient  Time out: Immediately prior to procedure a \"time out\" was called to verify the correct patient, procedure, equipment, support staff and site/side marked as required.  Timeout called at: 3/14/2024 9:16 PM.  Patient understanding: patient states understanding of the procedure being performed  Patient consent: the patient's understanding of the procedure matches consent given  Site marked: the operative site was marked  Patient identity confirmed: verbally with patient    Patient location:  ED  Location:     Type:  Abscess    Location:  Anogenital    Anogenital location:  Perirectal  Pre-procedure details:     Skin preparation:  Betadine  Anesthesia (see MAR for exact dosages):     Anesthesia method:  Local infiltration    Local anesthetic:  Lidocaine 1% WITH epi  Procedure details:     Complexity:  Intermediate    Incision types:  Single straight    Scalpel blade:  15    Approach:  Open    Incision depth:  Subcutaneous    Wound management:  Probed and deloculated    Drainage:  Purulent    Drainage amount:  Copious    Wound treatment:  Packing placed    Packing material: Single 4 x 4 gauze.  Post-procedure details:     Patient tolerance of procedure:  Tolerated well, no immediate complications          "

## 2024-03-15 NOTE — PLAN OF CARE
Problem: INFECTION - ADULT  Goal: Absence or prevention of progression during hospitalization  Description: INTERVENTIONS:  - Assess and monitor for signs and symptoms of infection  - Monitor lab/diagnostic results  - Monitor all insertion sites, i.e. indwelling lines, tubes, and drains  - Monitor endotracheal if appropriate and nasal secretions for changes in amount and color  - [place] appropriate cooling/warming therapies per order  - Administer medications as ordered  - Instruct and encourage patient and family to use good hand hygiene technique  - Identify and instruct in appropriate isolation precautions for identified infection/condition  Outcome: Progressing

## 2024-03-15 NOTE — DISCHARGE SUMMARY
"  Discharge Summary - Nate Todd 53 y.o. male MRN: [id number]    Unit/Bed#: -01 Encounter: [id number]    Admission Date: 3/14/2024   Discharge Date: 03/15/24    Admitting Diagnosis:   Perianal abscess [K61.0]  Perirectal abscess [K61.1]  Abscess [L02.91]  Essential hypertension [I10]  DM type 2 causing vascular disease (HCC) [E11.59]  Stage 1 chronic kidney disease [N18.1]    Discharge Diagnoses: Principal Problem:    Perirectal abscess  Active Problems:    Essential hypertension    Stage 1 chronic kidney disease    DM type 2 causing vascular disease (HCC)    Abnormal findings on diagnostic imaging of liver      Consultations: ***    Imaging: ***    Procedures Performed: ***    HPI: (obtained from admission H&P completed by *** on *** )  ***    Hospital Course: Nate Todd is a 53 y.o. male who presented 3/14/2024 with ***. The patient was take to the operating room on ***. Intraoperative findings included: ***. The patient's hospital course was un***complicated by ***    Patient was discharged on hospital day***/POD***. On the day of discharge, the patient was voiding spontaneously, ambulating at baseline, and pain was well controlled. The patient was sent home with medication for *** (pain, stool softener, nausea). *** She/He understood all instructions for discharge. *** She/He was also given the names and numbers of the providers as well as instructions for follow up appointments.     Condition at Discharge: {condition:11526}     Discharge instructions/Information to patient and family:   See after visit summary for information provided to patient and family.      Provisions for Follow-Up Care:  See after-visit summary for information related to follow-up care and any pertinent home health orders.      Disposition: {Discharge Disposition:60624}    Planned Readmission: {EXAM; YES/NO:04908::\"No\"}    Discharge Statement   I spent 30 minutes discharging the patient. This time was spent on the " day of discharge. I had direct contact with the patient on the day of discharge. Additional documentation is required if more than 30 minutes were spent on discharge.     Discharge Medications:  See after visit summary for reconciled discharge medications provided to patient and family.                  minutes discharging the patient. This time was spent on the day of discharge. I had direct contact with the patient on the day of discharge. Additional documentation is required if more than 30 minutes were spent on discharge.     Discharge Medications:  See after visit summary for reconciled discharge medications provided to patient and family.

## 2024-03-15 NOTE — PLAN OF CARE
Problem: INFECTION - ADULT  Goal: Absence or prevention of progression during hospitalization  Description: INTERVENTIONS:  - Assess and monitor for signs and symptoms of infection  - Monitor lab/diagnostic results  - Monitor all insertion sites, i.e. indwelling lines, tubes, and drains  - Monitor endotracheal if appropriate and nasal secretions for changes in amount and color  - [place] appropriate cooling/warming therapies per order  - Administer medications as ordered  - Instruct and encourage patient and family to use good hand hygiene technique  - Identify and instruct in appropriate isolation precautions for identified infection/condition  Outcome: Progressing  Goal: Absence of fever/infection during neutropenic period  Description: INTERVENTIONS:  - Monitor WBC    Outcome: Progressing     Problem: PAIN - ADULT  Goal: Verbalizes/displays adequate comfort level or baseline comfort level  Description: Interventions:  - Encourage patient to monitor pain and request assistance  - Assess pain using appropriate pain scale  - Administer analgesics based on type and severity of pain and evaluate response  - Implement non-pharmacological measures as appropriate and evaluate response  - Consider cultural and social influences on pain and pain management  - Notify physician/advanced practitioner if interventions unsuccessful or patient reports new pain  Outcome: Progressing     Problem: DISCHARGE PLANNING  Goal: Discharge to home or other facility with appropriate resources  Description: INTERVENTIONS:  - Identify barriers to discharge w/patient and caregiver  - Arrange for needed discharge resources and transportation as appropriate  - Identify discharge learning needs (meds, wound care, etc.)  - Arrange for interpretive services to assist at discharge as needed  - Refer to Case Management Department for coordinating discharge planning if the patient needs post-hospital services based on physician/advanced  practitioner order or complex needs related to functional status, cognitive ability, or social support system  Outcome: Progressing     Problem: Knowledge Deficit  Goal: Patient/family/caregiver demonstrates understanding of disease process, treatment plan, medications, and discharge instructions  Description: Complete learning assessment and assess knowledge base.  Interventions:  - Provide teaching at level of understanding  - Provide teaching via preferred learning methods  Outcome: Progressing

## 2024-03-15 NOTE — ASSESSMENT & PLAN NOTE
Lab Results   Component Value Date    EGFR 80 03/15/2024    EGFR 81 03/14/2024    EGFR 109 02/05/2024    CREATININE 1.04 03/15/2024    CREATININE 1.03 03/14/2024    CREATININE 0.69 02/05/2024   Baseline 0.8-1  Currently at baseline   Avoid hypotension and nephrotoxic agents

## 2024-03-15 NOTE — INCIDENTAL FINDINGS
The following findings require follow up:  Radiographic finding   Finding: New Liver Lesion    Follow up required: MRI of liver   Follow up should be done within 1 month(s)    Please notify the following clinician to assist with the follow up:   Dr. Alamo

## 2024-03-15 NOTE — ASSESSMENT & PLAN NOTE
Lab Results   Component Value Date    HGBA1C 8.4 (H) 11/13/2023       Recent Labs     03/15/24  0728   POCGLU 172*       Blood Sugar Average: Last 72 hrs:  (P) 172  Insulin sliding scale and hypoglycemia protocol  Hold home diabetic medications - can be resumed on discharge and stop insulin

## 2024-03-15 NOTE — PROGRESS NOTES
Patient made aware of need to bring in finerenone from home in order to receive during hospital stay due to being unavailable from pharmacy.

## 2024-03-15 NOTE — CASE MANAGEMENT
Case Management Discharge Planning Note    Patient name Nate Todd  Location /-01 MRN [id number]  : 1970 Date 3/15/2024       Current Admission Date: 3/14/2024  Current Admission Diagnosis:Perirectal abscess   Patient Active Problem List    Diagnosis Date Noted    Perirectal abscess 2024    Abnormal findings on diagnostic imaging of liver 2024    Stage 1 chronic kidney disease 2024    PAD (peripheral artery disease) (HCC) 2022    Iliac artery stenosis, right (HCC) 2018    Hyperlipidemia 08/10/2018    S/P hernia repair 08/10/2018    DM type 2 causing vascular disease (HCC) 08/10/2018    Essential hypertension 2017    Hemangioma of liver 2017    Obstructive sleep apnea syndrome 2017    Current smoker 2015    Abdominal aortic aneurysm (AAA) (HCC) 2015      LOS (days): 0  Geometric Mean LOS (GMLOS) (days):   Days to GMLOS:     OBJECTIVE:            Current admission status: Observation   Preferred Pharmacy:   [id number] - [place], [place] - [address]  [address]  [facility] [address]  Phone: [phone] Fax: [phone]    Primary Care Provider: SHAWNEE Duncan    Primary Insurance: [employer]  Secondary Insurance:     DISCHARGE DETAILS:    Discharge planning discussed with:: patient  Freedom of Choice: Yes  Comments - Freedom of Choice: blanket [facility] referral for VN  CM contacted family/caregiver?: Yes  Were Treatment Team discharge recommendations reviewed with patient/caregiver?: Yes  Did patient/caregiver verbalize understanding of patient care needs?: Yes  Were patient/caregiver advised of the risks associated with not following Treatment Team discharge recommendations?: Yes    Contacts  Patient Contacts: Corina Todd, spouse  Relationship to Patient:: Family  Contact Method: Phone  Phone Number: [phone] (M)  Reason/Outcome: Discharge Planning    Requested Home  Health Care         Is the patient interested in HHC at discharge?: Yes  Home Health Discipline requested:: Nursing  Home Health Agency Name:: [facility]  HHA External Referral Reason (only applicable if external HHA name selected): Services not provided in network or near patient location  Home Health Follow-Up Provider:: PCP  Home Health Services Needed:: Wound/Ostomy Care    DME Referral Provided  Referral made for DME?: No    Other Referral/Resources/Interventions Provided:  Interventions: HHC  Referral Comments: [facility]         Treatment Team Recommendation: Home with Home Health Care  Discharge Destination Plan:: Home with Home Health Care  Transport at Discharge : Self            CM met with patient to review [facility] Provider Choice List. Patient has no preference.    CM contacted spouse to review [facility] Provider Choie List.  CM informed spouse [facility] can start soonest and spouse indicated PCP is from [facility].    CM secured [facility].

## 2024-03-15 NOTE — CASE MANAGEMENT
Case Management Discharge Planning Note    Patient name Nate Todd  Location /-01 MRN [id number]  : 1970 Date 3/15/2024       Current Admission Date: 3/14/2024  Current Admission Diagnosis:Perirectal abscess   Patient Active Problem List    Diagnosis Date Noted    Perirectal abscess 2024    Abnormal findings on diagnostic imaging of liver 2024    Stage 1 chronic kidney disease 2024    PAD (peripheral artery disease) (HCC) 2022    Iliac artery stenosis, right (HCC) 2018    Hyperlipidemia 08/10/2018    S/P hernia repair 08/10/2018    DM type 2 causing vascular disease (HCC) 08/10/2018    Essential hypertension 2017    Hemangioma of liver 2017    Obstructive sleep apnea syndrome 2017    Current smoker 2015    Abdominal aortic aneurysm (AAA) (HCC) 2015      LOS (days): 0  Geometric Mean LOS (GMLOS) (days):   Days to GMLOS:     OBJECTIVE:            Current admission status: Observation   [facility]acy[facility]   [id number] [place] [facility] [address]  [address] [place] [address]  Phone: [phone] Fax: [phone]    Primary Care Provider: SHAWNEE Duncan    Primary Insurance: WAYNE DOM[employer]NGUEZ  Secondary Insurance:     DISCHARGE DETAILS:        AVS tp [facility]  AVS uploaded to [facility] in [facility]  [facility] order attached in [facility], along with physically signed order

## 2024-03-15 NOTE — CASE MANAGEMENT
Case Management Assessment & Discharge Planning Note    Patient name Nate Todd  Location /-01 MRN [id number]  : 1970 Date 3/15/2024       Current Admission Date: 3/14/2024  Current Admission Diagnosis:Perirectal abscess   Patient Active Problem List    Diagnosis Date Noted    Perirectal abscess 2024    Abnormal findings on diagnostic imaging of liver 2024    Stage 1 chronic kidney disease 2024    PAD (peripheral artery disease) (HCC) 2022    Iliac artery stenosis, right (HCC) 2018    Hyperlipidemia 08/10/2018    S/P hernia repair 08/10/2018    DM type 2 causing vascular disease (HCC) 08/10/2018    Essential hypertension 2017    Hemangioma of liver 2017    Obstructive sleep apnea syndrome 2017    Current smoker 2015    Abdominal aortic aneurysm (AAA) (HCC) 2015      LOS (days): 0  Geometric Mean LOS (GMLOS) (days):   Days to GMLOS:     OBJECTIVE:              Current admission status: Observation  Referral Reason:  (Post Acute Home Needs (VNA/DME/Infusion))    Preferred Pharmacy:   [id number] - [place], [place] - [address]  [address]  [facility] [address]  Phone: [phone] Fax: [phone]    Primary Care Provider: No primary care provider on file.    Primary Insurance: [employer] [facility]  Secondary Insurance:     ASSESSMENT:  Active Health Care Proxies    There are no active Health Care Proxies on file.       Advance Directives  Does patient have a Health Care POA?: No  Was patient offered paperwork?: Yes (pt declined)  Does patient currently have a Health Care decision maker?: Yes, please see Health Care Proxy section  Does patient have Advance Directives?: No  Was patient offered paperwork?: Yes (pt declined)  Primary Contact: Corina Todd, spouse         Readmission Root Cause  30 Day Readmission: No    Patient Information  Admitted from:: Home  Mental Status: Alert  During  Assessment patient was accompanied by: Not accompanied during assessment  Assessment information provided by:: Patient  Primary Caregiver: Self  Support Systems: Spouse/significant other, Son  County of Residence: [facility]  What city do you live in?: [place]  Home entry access options. Select all that apply.: Stairs  Number of steps to enter home.: One Flight  Do the steps have railings?: Yes  Type of Current Residence: 2 story home  Upon entering residence, is there a bedroom on the main floor (no further steps)?: Yes  Upon entering residence, is there a bathroom on the main floor (no further steps)?: Yes (1/2 bath)  Number of steps to 2nd floor from main floor: One Flight  Living Arrangements: Lives w/ Spouse/significant other  Is patient a ?: No    Activities of Daily Living Prior to Admission  Functional Status: Independent  Completes ADLs independently?: Yes  Ambulates independently?: Yes  Does patient use assisted devices?: Yes  Assisted Devices (DME) used: CPAP  DME Company Name (respiratory supplies): [employer]  Does patient currently own DME?: Yes  What DME does the patient currently own?: CPAP  Does patient have a history of Outpatient Therapy (PT/OT)?: No  Does the patient have a history of Short-Term Rehab?: No  Does patient have a history of HHC?: No  Does patient currently have HHC?: No         Patient Information Continued  Income Source: Employed  Does patient have prescription coverage?: Yes  Does patient receive dialysis treatments?: No  Does patient have a history of substance abuse?: No  Does patient have a history of Mental Health Diagnosis?: No         Means of Transportation  Means of Transport to [facility]:: Drives Self      Social Determinants of Health (SDOH)      Flowsheet Row Most Recent Value   Housing Stability    In the last 12 months, was there a time when you were not able to pay the mortgage or rent on time? N   In the last 12 months, how many places have you lived? 1   In  the last 12 months, was there a time when you did not have a steady place to sleep or slept in a shelter (including now)? N   Transportation Needs    In the past 12 months, has lack of transportation kept you from medical appointments or from getting medications? no   In the past 12 months, has lack of transportation kept you from meetings, work, or from getting things needed for daily living? No   Food Insecurity    Within the past 12 months, you worried that your food would run out before you got the money to buy more. Never true   Within the past 12 months, the food you bought just didn't last and you didn't have money to get more. Never true   Utilities    In the past 12 months has the electric, gas, oil, or water company threatened to shut off services in your home? No            DISCHARGE DETAILS:    Discharge planning discussed with:: patient  Freedom of Choice: Yes  Comments - Freedom of Choice: blanket HHC referral for VN  CM contacted family/caregiver?: No- see comments (pt declined)  Were Treatment Team discharge recommendations reviewed with patient/caregiver?: Yes  Did patient/caregiver verbalize understanding of patient care needs?: Yes  Were patient/caregiver advised of the risks associated with not following Treatment Team discharge recommendations?: Yes         Requested Home Health Care         Is the patient interested in HHC at discharge?: Yes  Home Health Discipline requested:: Nursing  HHA External Referral Reason (only applicable if external HHA name selected): Services not provided in network or near patient location  Home Health Follow-Up Provider:: PCP  Home Health Services Needed:: Wound/Ostomy Care    DME Referral Provided  Referral made for DME?: No    Other Referral/Resources/Interventions Provided:  Interventions: HHC         Treatment Team Recommendation: Home with Home Health Care  Discharge Destination Plan:: Home with Home Health Care  Transport at Discharge : Self                  CM met with patient at the bedside, baseline information was obtained. CM discussed the role of CM in helping the patient develop a discharge plan and assist the patient in carry out their plan.     Patient lives with spouse in 2-3 story home. Bedroom and 1/2 bath on first floor of home, full bathroom on 2nd floor of home. Patient independent at baseline. Patient works as .     CM discussed with patient VN referral for dressing changes and patient agreeable to blanket HHC referral indicating he will be taking one week off work due to need for dressing changes. A post acute care recommendation was made by your care team for [facility].  Discussed Freedom of Choice with patient. Offered patient blanket referral for agencies via in person. patient aware the list is custom by insurance and patient's medical needs.        CM to follow for acceptance and review options with patient.

## 2024-03-15 NOTE — PROGRESS NOTES
Patient:    MRN:  [id number]    Landon Request ID:  [id number]    Level of care reserved:  Home Health Agency    Partner Reserved:  [facility], [place] [address] [phone]    Clinical needs requested:    Geography searched:  [phone]    Start of Service:    Request sent:  11:39am EDT on 3/15/2024 by Jillian Anne    Partner reserved:  12:39pm EDT on 3/15/2024 by Jillian Anne    Choice list shared:  12:21pm EDT on 3/15/2024 by Jillian Anne

## 2024-03-15 NOTE — DISCHARGE INSTR - AVS FIRST PAGE
The packing should be exchanged daily.. To exchange: remove packing, gently wash the wound, dry, repack the wound with a saline-moistened 4x4 gauze opened up, then tuck another 4x4 gauze against the wound. Cover with additional gauze or ABD as needed. Secure dressings with tape or use your underwear to hold them in place.  Be careful not to get the wound wet between packing changes.    An antibiotic has been sent to your pharmacy, please finish the full course.    The surgery office will be reaching out soon to help set up an appointment for approx 1 week from now.

## 2024-03-15 NOTE — UTILIZATION REVIEW
Initial Clinical Review    Admission: Date/Time/Statement:   Admission Orders (From admission, onward)       Ordered        03/14/24 2128  Place in Observation  Once                          Orders Placed This Encounter   Procedures    Place in Observation     Standing Status:   Standing     Number of Occurrences:   1     Order Specific Question:   Level of Care     Answer:   Med Surg [16]     ED Arrival Information       Expected   -    Arrival   3/14/2024 16:18    Acuity   Less Urgent              Means of arrival   Walk-In    Escorted by   Spouse    Service   Surgery-General    Admission type   Emergency              Arrival complaint   Cyst/ Pain             Chief Complaint   Patient presents with    Abscess     Pt reports having an anorectal abscess for a little over a week. Went to Urgent care and got a topical cream and antibiotics but pt is having an increase in pain       Initial Presentation: 53 y.o. male pmh hypertension and type 2 diabetes mellitus, CKD1, OP management of Perirectal abscess w topical cream (Proctofoam) & antibx (Bactrim) to ED w Spouse w 1 wk worsening  painful erythematous swelling to his perirectal area. EXAM large erythematous tender indurated fluctuant abscess, tachycardia, leukocytosis, CT a/p Perianal abscess w cellulitic change. Observation admission on [facility] Surgery service due to Perirectal abscess. Bedside I&D for copious purulent drainage; packing placed , consult Internal med  Date: 3/15   Day 2:   Internal medicine: Cont IV Zosyn; follow vitals/fever. Monitor BCX, Wound CX; pain management; follow up OP PCP for incidental CT finding of enhancing lesion in posterior R liver dome. Cont PTA meds, IVF,  follow CR    ED Triage Vitals   Temperature Pulse Respirations Blood Pressure SpO2   03/14/24 1629 03/14/24 1626 03/14/24 1626 03/14/24 1626 03/14/24 1626   100.1 °F (37.8 °C) (!) 116 18 133/73 97 %      Temp Source Heart Rate Source Patient Position - Orthostatic VS BP Location  FiO2 (%)   03/14/24 1629 03/14/24 1626 03/14/24 1626 03/14/24 1626 --   Oral Monitor Sitting Right arm       Pain Score       03/14/24 2033       6          Wt Readings from Last 1 Encounters:   03/14/24 96.2 kg (212 lb)     Additional Vital Signs:   Date/Time Temp Pulse Resp BP MAP (mmHg) SpO2 O2 Device Patient Position - Orthostatic VS   03/15/24 0845 -- -- -- -- -- -- None (Room air) --   03/15/24 07:25:08 97.9 °F (36.6 °C) 87 18 130/76 94 96 % -- --   03/14/24 22:02:32 98.1 °F (36.7 °C) 101 -- 162/88 113 95 % -- --   03/14/24 2202 -- -- 18 -- -- -- -- --   03/14/24 2045 -- 100 18 124/77 96 94 % None (Room air) Lying   03/14/24 1913 -- -- -- -- -- -- None (Room air) --   03/14/24 1629 100.1 °F (37.8 °C) -- -- -- -- -- -- --   03/14/24 1626 -- 116 Abnormal  18 133/73 -- 97 % None (Room air) Sitting     Weights (last 14 days)    Date/Time Weight Weight Method Height   03/14/24 2202 96.2 kg (212 lb) Stated 6' (1.829 m)   03/14/24 1626 96.2 kg (212 lb) Stated 6' (1.829 m)     Pertinent Labs/Diagnostic Test Results:   CT abdomen pelvis with contrast   Final Result by Doroteo Thakkar MD (03/14 1923)      Perianal abscess appears to involve the right medial gluteal fold measuring 2.9 x 4.7 x 5.6 cm. Cellulitic changes are seen adjacent to the abscess.      Postsurgical changes of the abdominal aorta and common iliac arteries as described with stable appearance.      Enhancing lesion in the posterior right liver dome measuring 3.5 cm not seen previously. Further evaluation/characterization with MRI recommended.            The study was marked in EPIC for immediate notification and MRI follow-up notification for 2 months.         Workstation performed: [id number]               Results from last 7 days   Lab Units 03/15/24  0510 03/14/24  2227 03/14/24  1730   WBC Thousand/uL 17.45*  --  14.71*   HEMOGLOBIN g/dL 13.4  --  15.5   HEMATOCRIT % 39.3  --  44.4   PLATELETS Thousands/uL 220 303 289   NEUTROS ABS Thousands/µL  "14.85*  --  11.13*         Results from last 7 days   Lab Units 03/15/24  0510 03/14/24  1730   SODIUM mmol/L 135 134*   POTASSIUM mmol/L 4.3 3.9   CHLORIDE mmol/L 104 98   CO2 mmol/L 24 25   ANION GAP mmol/L 7 11   BUN mg/dL 17 17   CREATININE mg/dL 1.04 1.03   EGFR ml/min/1.73sq m 80 81   CALCIUM mg/dL 8.4 9.4   MAGNESIUM mg/dL 2.0  --    PHOSPHORUS mg/dL 4.0  --      Results from last 7 days   Lab Units 03/14/24  1730   AST U/L 9*   ALT U/L 11   ALK PHOS U/L 91   TOTAL PROTEIN g/dL 8.4   ALBUMIN g/dL 4.1   TOTAL BILIRUBIN mg/dL 0.52     Results from last 7 days   Lab Units 03/15/24  1107 03/15/24  0728   POC GLUCOSE mg/dl 207* 172*     Results from last 7 days   Lab Units 03/15/24  0510 03/14/24  1730   GLUCOSE RANDOM mg/dL 200* 235*             No results found for: \"BETA-HYDROXYBUTYRATE\"                           Results from last 7 days   Lab Units 03/14/24  1730   PROTIME seconds 13.0   INR  0.95   PTT seconds 28             Results from last 7 days   Lab Units 03/14/24  1730   LACTIC ACID mmol/L 1.6                                                                                 Results from last 7 days   Lab Units 03/14/24  2109 03/14/24  1730   BLOOD CULTURE   --  Received in Microbiology Lab. Culture in Progress.  Received in Microbiology Lab. Culture in Progress.   GRAM STAIN RESULT  2+ Polys*  3+ Gram positive cocci in pairs*  2+ Gram negative rods*  --                    ED Treatment:   Medication Administration from 03/14/2024 1615 to 03/14/2024 2151         Date/Time Order Dose Route Action Action by Comments     03/14/2024 1834 EDT sodium chloride 0.9 % bolus 1,000 mL 0 mL Intravenous Stopped Taras Martinez RN --     03/14/2024 1734 EDT sodium chloride 0.9 % bolus 1,000 mL 1,000 mL Intravenous New Bag Taras Martinez RN --     03/14/2024 2033 EDT HYDROmorphone (DILAUDID) injection 0.5 mg 0.5 mg Intravenous Given Sandra Chavira pt. reports pain now     03/14/2024 1736 EDT HYDROmorphone " (DILAUDID) injection 0.5 mg 0 mg Intravenous Hold Taars Martinez RN reports his pain is ok for now while he isnt moving     03/14/2024 1832 EDT iohexol (OMNIPAQUE) 350 MG/ML injection (MULTI-DOSE) 100 mL 100 mL Intravenous Given Antonella Bradshawlee ann --     03/14/2024 2032 EDT piperacillin-tazobactam (ZOSYN) 4.5 g in sodium chloride 0.9 % 100 mL IVPB 0 g Intravenous Stopped Sandra CARIAS Fan --     03/14/2024 2000 EDT piperacillin-tazobactam (ZOSYN) 4.5 g in sodium chloride 0.9 % 100 mL IVPB 4.5 g Intravenous New Bag Sandra ARETHA Fan --     03/14/2024 2108 EDT lidocaine-epinephrine (XYLOCAINE/EPINEPHRINE) 1 %-1:100,000 injection 60 mL 60 mL Infiltration Given Sandra CARIAS Fan I&D on sacrum          Past Medical History:   Diagnosis Date    Chronic kidney disease     Diabetes mellitus (HCC)     High cholesterol     Hypertension      Present on Admission:   Perirectal abscess   Essential hypertension   Stage 1 chronic kidney disease   DM type 2 causing vascular disease (HCC)   Abnormal findings on diagnostic imaging of liver      Admitting Diagnosis: Perianal abscess [K61.0]  Perirectal abscess [K61.1]  Abscess [L02.91]  Essential hypertension [I10]  DM type 2 causing vascular disease (HCC) [E11.59]  Stage 1 chronic kidney disease [N18.1]  Age/Sex: 53 y.o. male  Admission Orders:  Wound care  I/O  Diet Carb / geovanna control    Scheduled Medications:  amLODIPine, 10 mg, Oral, Daily  atorvastatin, 10 mg, Oral, Daily  Finerenone, 1 tablet, Oral, Daily  heparin (porcine), 5,000 Units, Subcutaneous, Q8H LYNETTE  insulin lispro, 1-6 Units, Subcutaneous, TID AC  losartan, 100 mg, Oral, Daily  piperacillin-tazobactam, 4.5 g, Intravenous, Q8H      Continuous IV Infusions:  lactated ringers, 125 mL/hr, Intravenous, Continuous      PRN Meds:  morphine injection, 2 mg, Intravenous, Q3H PRN  oxyCODONE-acetaminophen, 2 tablet, Oral, Q4H PRN        IP CONSULT TO INTERNAL MEDICINE  IP CONSULT TO CASE MANAGEMENT    Network Utilization Review  Department  ATTENTION: Please call with any questions or concerns to [phone] and carefully listen to the prompts so that you are directed to the right person. All voicemails are confidential.   For Discharge needs, contact Care Management DC Support Team at [phone] opt. [phone]  Send all requests for admission clinical reviews, approved or denied determinations and any other requests to dedicated fax number below belonging to the campus where the patient is receiving treatment. List of dedicated fax numbers for the Facilities:  FACILITY NAME UR FAX NUMBER   ADMISSION DENIALS (Administrative/Medical Necessity) [phone]   DISCHARGE SUPPORT TEAM (NETWORK) [phone]   PARENT CHILD HEALTH (Maternity/NICU/Pediatrics) [phone]   [facility] [phone]   [facility] [phone]   [facility] [phone]   [facility] [phone]   [facility] [phone]   [facility] [phone]   [facility] [phone]   [facility] [phone]   [facility] [phone]   Pending sale to [facility] [phone]   [facility] [phone]   [facility] [phone]

## 2024-03-15 NOTE — ASSESSMENT & PLAN NOTE
CT abdomen and pelvis: Enhancing lesion in the posterior right liver dome measuring 3.5 cm not seen previously. Further evaluation/characterization with MRI recommended   Follow up with PCP for outpatient nonemergent MRI

## 2024-03-15 NOTE — CONSULTS
[facility]  Consult  Name: Nate Todd 53 y.o. male I MRN: [id number]  Unit/Bed#: -01 I Date of Admission: 3/14/2024   Date of Service: 3/15/2024 I Hospital Day: 0    Inpatient consult to Internal Medicine  Consult performed by: Rema Albert PA-C  Consult ordered by: Curt Moran MD          Assessment/Plan   * Perirectal abscess  Assessment & Plan  Presenting with worsening perirectal abscess over the past two days. Was placed on bactrim outpatient by urgent care without improvement   I&D by surgery at bedside on 3/14   Continue zosyn   Monitor vital signs and fever curve   Monitor blood cultures and would cultures   Pain management  Rest per primary     Abnormal findings on diagnostic imaging of liver  Assessment & Plan  CT abdomen and pelvis: Enhancing lesion in the posterior right liver dome measuring 3.5 cm not seen previously. Further evaluation/characterization with MRI recommended   Follow up with PCP for outpatient nonemergent MRI    DM type 2 causing vascular disease (HCC)  Assessment & Plan  Lab Results   Component Value Date    HGBA1C 8.4 (H) 11/13/2023       Recent Labs     03/15/24  0728   POCGLU 172*       Blood Sugar Average: Last 72 hrs:  (P) 172  Insulin sliding scale and hypoglycemia protocol  Hold home diabetic medications - can be resumed on discharge and stop insulin    Stage 1 chronic kidney disease  Assessment & Plan  Lab Results   Component Value Date    EGFR 80 03/15/2024    EGFR 81 03/14/2024    EGFR 109 02/05/2024    CREATININE 1.04 03/15/2024    CREATININE 1.03 03/14/2024    CREATININE 0.69 02/05/2024   Baseline 0.8-1  Currently at baseline   Avoid hypotension and nephrotoxic agents    Essential hypertension  Assessment & Plan  Continue amlodipine and losartan           VTE Prophylaxis:   Moderate Risk (Score 3-4) - Pharmacological DVT Prophylaxis Ordered: heparin.    Mobility:   Basic Mobility Inpatient Raw Score: 24  -[facility] Goal: 8: Walk  250 feet or more  JH-HLM Achieved: 6: Walk 10 steps or more  JH-HLM Goal achieved. Continue to encourage appropriate mobility.    Recommendations for Discharge:  Continue treatment of perirectal abscess with antibiotics, monitoring blood cultures and wound cultures. On discharge, can continue all home medications for chronic conditions of hypertension and type 2 diabetes    Total Time Spent on Date of Encounter in care of patient: 60 mins. This time was spent on one or more of the following: performing physical exam; counseling and coordination of care; obtaining or reviewing history; documenting in the medical record; reviewing/ordering tests, medications or procedures; communicating with other healthcare professionals and discussing with patient's family/caregivers.    History of Present Illness:  Nate Todd is a 53 y.o. male who is originally admitted to the surgery service due to perirectal abscess. We are consulted for hypertension and type 2 diabetes. Patient with worsening perirectal abscess outpatient. Was seen at urgent care and prescribed bactrim without improvement. Patient admitted to surgery service 3/14 and had bedside I&D. Patient does admit to fevers at home as well.     Review of Systems:  Review of Systems   Constitutional:  Negative for diaphoresis, fatigue and fever.   HENT:  Negative for congestion and sore throat.    Respiratory:  Negative for cough, chest tightness, shortness of breath and wheezing.    Cardiovascular:  Negative for chest pain, palpitations and leg swelling.   Gastrointestinal:  Positive for rectal pain. Negative for abdominal distention, abdominal pain, constipation, diarrhea, nausea and vomiting.   Genitourinary:  Negative for difficulty urinating, dysuria and hematuria.   Musculoskeletal:  Negative for arthralgias and back pain.   Skin:  Positive for wound.   Neurological:  Negative for dizziness, syncope, weakness, light-headedness and headaches.  "  Psychiatric/Behavioral:  Negative for agitation, behavioral problems and confusion.        Past Medical and Surgical History:   Past Medical History:   Diagnosis Date    Chronic kidney disease     Diabetes mellitus (HCC)     High cholesterol     Hypertension        Past Surgical History:   Procedure Laterality Date    ABDOMINAL AORTIC ANEURYSM REPAIR      ADENOIDECTOMY      CHOLECYSTECTOMY      COLON SIGMOID RESECTION      COLOSTOMY      HERNIA REPAIR      REVISION COLOSTOMY      TONSILLECTOMY         Meds/Allergies:  all medications and allergies reviewed    Allergies:   Allergies   Allergen Reactions    Latex Rash     \"BLISTER\"    Medical Tape Rash     \"Redness,blister  & swells\"       Social History:  Marital Status: /Civil Union  Substance Use History:   Social History     Substance and Sexual Activity   Alcohol Use Not Currently     Social History     Tobacco Use   Smoking Status Every Day    Current packs/day: 1.00    Types: Cigarettes    Passive exposure: Current   Smokeless Tobacco Never     Social History     Substance and Sexual Activity   Drug Use Not Currently       Family History:  Family History   Problem Relation Age of Onset    Heart disease Mother     Aneurysm Mother     Irregular heart beat Mother     Heart disease Father        Physical Exam:   Vitals:   Blood Pressure: 130/76 (03/15/24 0725)  Pulse: 87 (03/15/24 0725)  Temperature: 97.9 °F (36.6 °C) (03/15/24 0725)  Temp Source: Oral (03/14/24 1629)  Respirations: 18 (03/15/24 0725)  Height: 6' (182.9 cm) (03/14/24 2202)  Weight - Scale: 96.2 kg (212 lb) (03/14/24 2202)  SpO2: 96 % (03/15/24 0725)    Physical Exam  Vitals reviewed.   Constitutional:       General: He is not in acute distress.     Appearance: Normal appearance. He is not ill-appearing.   HENT:      Head: Normocephalic and atraumatic.      Nose: Nose normal.      Mouth/Throat:      Mouth: Mucous membranes are moist.      Pharynx: Oropharynx is clear.   Eyes:      " Extraocular Movements: Extraocular movements intact.      Conjunctiva/sclera: Conjunctivae normal.   Cardiovascular:      Rate and Rhythm: Normal rate and regular rhythm.      Pulses: Normal pulses.      Heart sounds: Normal heart sounds. No murmur heard.  Pulmonary:      Effort: Pulmonary effort is normal. No respiratory distress.      Breath sounds: Normal breath sounds. No wheezing.   Abdominal:      General: Abdomen is flat. Bowel sounds are normal. There is no distension.      Palpations: Abdomen is soft.      Tenderness: There is no abdominal tenderness. There is no guarding.   Genitourinary:     Comments: Dressing over rectum cdi  Musculoskeletal:         General: Normal range of motion.      Cervical back: Normal range of motion.      Right lower leg: No edema.      Left lower leg: No edema.   Skin:     General: Skin is warm.   Neurological:      General: No focal deficit present.      Mental Status: He is alert and oriented to person, place, and time. Mental status is at baseline.      Motor: No weakness.   Psychiatric:         Mood and Affect: Mood normal.         Behavior: Behavior normal.         Thought Content: Thought content normal.         Judgment: Judgment normal.          Additional Data:   Lab Results:    Results from last 7 days   Lab Units 03/15/24  0510   WBC Thousand/uL 17.45*   HEMOGLOBIN g/dL 13.4   HEMATOCRIT % 39.3   PLATELETS Thousands/uL 220   NEUTROS PCT % 85*   LYMPHS PCT % 7*   MONOS PCT % 7   EOS PCT % 0     Results from last 7 days   Lab Units 03/15/24  0510 03/14/24  1730   SODIUM mmol/L 135 134*   POTASSIUM mmol/L 4.3 3.9   CHLORIDE mmol/L 104 98   CO2 mmol/L 24 25   BUN mg/dL 17 17   CREATININE mg/dL 1.04 1.03   ANION GAP mmol/L 7 11   CALCIUM mg/dL 8.4 9.4   ALBUMIN g/dL  --  4.1   TOTAL BILIRUBIN mg/dL  --  0.52   ALK PHOS U/L  --  91   ALT U/L  --  11   AST U/L  --  9*   GLUCOSE RANDOM mg/dL 200* 235*     Results from last 7 days   Lab Units 03/14/24  1730   INR  0.95          Lab Results   Component Value Date/Time    HGBA1C 8.4 (H) 11/13/2023 02:15 PM    HGBA1C 9.1 (H) 06/26/2023 02:39 PM    HGBA1C 8.8 (H) 03/27/2023 02:30 PM     Results from last 7 days   Lab Units 03/15/24  0728   POC GLUCOSE mg/dl 172*     Results from last 7 days   Lab Units 03/14/24  1730   LACTIC ACID mmol/L 1.6       Imaging: Reviewed radiology reports from this admission including: abdominal/pelvic CT  CT abdomen pelvis with contrast   Final Result by Doroteo Thakkar MD (03/14 1923)      Perianal abscess appears to involve the right medial gluteal fold measuring 2.9 x 4.7 x 5.6 cm. Cellulitic changes are seen adjacent to the abscess.      Postsurgical changes of the abdominal aorta and common iliac arteries as described with stable appearance.      Enhancing lesion in the posterior right liver dome measuring 3.5 cm not seen previously. Further evaluation/characterization with MRI recommended.            The study was marked in EPIC for immediate notification and MRI follow-up notification for 2 months.         Workstation performed: [id number]             EKG, Pathology, and Other Studies Reviewed on Admission:   EKG: No EKG obtained.    ** Please Note: This note may have been constructed using a voice recognition system. **

## 2024-03-17 LAB
BACTERIA BLD CULT: NORMAL
BACTERIA BLD CULT: NORMAL
BACTERIA WND AEROBE CULT: NO GROWTH
GRAM STN SPEC: ABNORMAL

## 2024-03-19 LAB
BACTERIA BLD CULT: NORMAL
BACTERIA BLD CULT: NORMAL
BACTERIA WND AEROBE CULT: ABNORMAL
GRAM STN SPEC: ABNORMAL

## 2024-03-22 ENCOUNTER — HOSPITAL ENCOUNTER (INPATIENT)
Facility: HOSPITAL | Age: 54
LOS: 3 days | Discharge: HOME HEALTH | DRG: 254 | End: 2024-03-25
Attending: EMERGENCY MEDICINE | Admitting: SPECIALIST
Payer: COMMERCIAL

## 2024-03-22 DIAGNOSIS — E11.59 DM TYPE 2 CAUSING VASCULAR DISEASE (HCC): ICD-10-CM

## 2024-03-22 DIAGNOSIS — K61.0 PERIANAL ABSCESS: ICD-10-CM

## 2024-03-22 DIAGNOSIS — I73.9 PAD (PERIPHERAL ARTERY DISEASE) (HCC): ICD-10-CM

## 2024-03-22 DIAGNOSIS — K61.1 PERIRECTAL ABSCESS: Primary | ICD-10-CM

## 2024-03-22 DIAGNOSIS — N18.1 STAGE 1 CHRONIC KIDNEY DISEASE: ICD-10-CM

## 2024-03-22 LAB
ALBUMIN SERPL BCP-MCNC: 3.6 G/DL (ref 3.5–5)
ALP SERPL-CCNC: 109 U/L (ref 34–104)
ALT SERPL W P-5'-P-CCNC: 30 U/L (ref 7–52)
ANION GAP SERPL CALCULATED.3IONS-SCNC: 7 MMOL/L (ref 4–13)
ANISOCYTOSIS BLD QL SMEAR: PRESENT
AST SERPL W P-5'-P-CCNC: 10 U/L (ref 13–39)
B-OH-BUTYR SERPL-MCNC: <0.05 MMOL/L (ref 0.02–0.27)
BASOPHILS # BLD MANUAL: 0 THOUSAND/UL (ref 0–0.1)
BASOPHILS NFR MAR MANUAL: 0 % (ref 0–1)
BILIRUB SERPL-MCNC: 0.34 MG/DL (ref 0.2–1)
BUN SERPL-MCNC: 17 MG/DL (ref 5–25)
CALCIUM SERPL-MCNC: 8.9 MG/DL (ref 8.4–10.2)
CHLORIDE SERPL-SCNC: 99 MMOL/L (ref 96–108)
CO2 SERPL-SCNC: 28 MMOL/L (ref 21–32)
CREAT SERPL-MCNC: 0.88 MG/DL (ref 0.6–1.3)
EOSINOPHIL # BLD MANUAL: 0 THOUSAND/UL (ref 0–0.4)
EOSINOPHIL NFR BLD MANUAL: 0 % (ref 0–6)
ERYTHROCYTE [DISTWIDTH] IN BLOOD BY AUTOMATED COUNT: 12.4 % (ref 11.6–15.1)
GFR SERPL CREATININE-BSD FRML MDRD: 96 ML/MIN/1.73SQ M
GLUCOSE SERPL-MCNC: 179 MG/DL (ref 65–140)
GLUCOSE SERPL-MCNC: 196 MG/DL (ref 65–140)
GLUCOSE SERPL-MCNC: 248 MG/DL (ref 65–140)
HCT VFR BLD AUTO: 44 % (ref 36.5–49.3)
HGB BLD-MCNC: 15 G/DL (ref 12–17)
LACTATE SERPL-SCNC: 1.2 MMOL/L (ref 0.5–2)
LG PLATELETS BLD QL SMEAR: PRESENT
LYMPHOCYTES # BLD AUTO: 2.59 THOUSAND/UL (ref 0.6–4.47)
LYMPHOCYTES # BLD AUTO: 22 % (ref 14–44)
MCH RBC QN AUTO: 30 PG (ref 26.8–34.3)
MCHC RBC AUTO-ENTMCNC: 34.1 G/DL (ref 31.4–37.4)
MCV RBC AUTO: 88 FL (ref 82–98)
MONOCYTES # BLD AUTO: 0.47 THOUSAND/UL (ref 0–1.22)
MONOCYTES NFR BLD: 4 % (ref 4–12)
MYELOCYTES NFR BLD MANUAL: 1 % (ref 0–1)
NEUTROPHILS # BLD MANUAL: 8.61 THOUSAND/UL (ref 1.85–7.62)
NEUTS BAND NFR BLD MANUAL: 1 % (ref 0–8)
NEUTS SEG NFR BLD AUTO: 72 % (ref 43–75)
PLATELET # BLD AUTO: 440 THOUSANDS/UL (ref 149–390)
PLATELET BLD QL SMEAR: ABNORMAL
PMV BLD AUTO: 8.1 FL (ref 8.9–12.7)
POTASSIUM SERPL-SCNC: 3.7 MMOL/L (ref 3.5–5.3)
PROT SERPL-MCNC: 7.8 G/DL (ref 6.4–8.4)
RBC # BLD AUTO: 5 MILLION/UL (ref 3.88–5.62)
RBC MORPH BLD: NORMAL
SODIUM SERPL-SCNC: 134 MMOL/L (ref 135–147)
WBC # BLD AUTO: 11.79 THOUSAND/UL (ref 4.31–10.16)

## 2024-03-22 PROCEDURE — 96375 TX/PRO/DX INJ NEW DRUG ADDON: CPT

## 2024-03-22 PROCEDURE — 36415 COLL VENOUS BLD VENIPUNCTURE: CPT | Performed by: EMERGENCY MEDICINE

## 2024-03-22 PROCEDURE — 80053 COMPREHEN METABOLIC PANEL: CPT | Performed by: EMERGENCY MEDICINE

## 2024-03-22 PROCEDURE — 99285 EMERGENCY DEPT VISIT HI MDM: CPT | Performed by: EMERGENCY MEDICINE

## 2024-03-22 PROCEDURE — 99284 EMERGENCY DEPT VISIT MOD MDM: CPT

## 2024-03-22 PROCEDURE — 96374 THER/PROPH/DIAG INJ IV PUSH: CPT

## 2024-03-22 PROCEDURE — 82010 KETONE BODYS QUAN: CPT | Performed by: EMERGENCY MEDICINE

## 2024-03-22 PROCEDURE — 82948 REAGENT STRIP/BLOOD GLUCOSE: CPT

## 2024-03-22 PROCEDURE — 85007 BL SMEAR W/DIFF WBC COUNT: CPT | Performed by: EMERGENCY MEDICINE

## 2024-03-22 PROCEDURE — 83605 ASSAY OF LACTIC ACID: CPT | Performed by: EMERGENCY MEDICINE

## 2024-03-22 PROCEDURE — 85027 COMPLETE CBC AUTOMATED: CPT | Performed by: EMERGENCY MEDICINE

## 2024-03-22 PROCEDURE — 96361 HYDRATE IV INFUSION ADD-ON: CPT

## 2024-03-22 PROCEDURE — 99222 1ST HOSP IP/OBS MODERATE 55: CPT

## 2024-03-22 RX ORDER — GLIPIZIDE 5 MG/1
10 TABLET, FILM COATED, EXTENDED RELEASE ORAL DAILY
COMMUNITY

## 2024-03-22 RX ORDER — CEFEPIME HYDROCHLORIDE 2 G/50ML
2000 INJECTION, SOLUTION INTRAVENOUS ONCE
Status: DISCONTINUED | OUTPATIENT
Start: 2024-03-22 | End: 2024-03-22 | Stop reason: SDUPTHER

## 2024-03-22 RX ORDER — HEPARIN SODIUM 5000 [USP'U]/ML
5000 INJECTION, SOLUTION INTRAVENOUS; SUBCUTANEOUS EVERY 8 HOURS SCHEDULED
Status: DISCONTINUED | OUTPATIENT
Start: 2024-03-22 | End: 2024-03-25 | Stop reason: HOSPADM

## 2024-03-22 RX ORDER — DOCUSATE SODIUM 100 MG/1
100 CAPSULE, LIQUID FILLED ORAL 2 TIMES DAILY
Status: DISCONTINUED | OUTPATIENT
Start: 2024-03-22 | End: 2024-03-25 | Stop reason: HOSPADM

## 2024-03-22 RX ORDER — LOSARTAN POTASSIUM 50 MG/1
100 TABLET ORAL DAILY
Status: DISCONTINUED | OUTPATIENT
Start: 2024-03-22 | End: 2024-03-25 | Stop reason: HOSPADM

## 2024-03-22 RX ORDER — AMLODIPINE BESYLATE 10 MG/1
10 TABLET ORAL DAILY
Status: DISCONTINUED | OUTPATIENT
Start: 2024-03-22 | End: 2024-03-25 | Stop reason: HOSPADM

## 2024-03-22 RX ORDER — NICOTINE 21 MG/24HR
1 PATCH, TRANSDERMAL 24 HOURS TRANSDERMAL DAILY
Status: DISCONTINUED | OUTPATIENT
Start: 2024-03-22 | End: 2024-03-25 | Stop reason: HOSPADM

## 2024-03-22 RX ORDER — VANCOMYCIN HYDROCHLORIDE 1 G/200ML
1000 INJECTION, SOLUTION INTRAVENOUS ONCE
Status: DISCONTINUED | OUTPATIENT
Start: 2024-03-22 | End: 2024-03-22

## 2024-03-22 RX ORDER — ATORVASTATIN CALCIUM 10 MG/1
10 TABLET, FILM COATED ORAL DAILY
Status: DISCONTINUED | OUTPATIENT
Start: 2024-03-22 | End: 2024-03-25 | Stop reason: HOSPADM

## 2024-03-22 RX ORDER — ONDANSETRON 2 MG/ML
4 INJECTION INTRAMUSCULAR; INTRAVENOUS ONCE
Status: COMPLETED | OUTPATIENT
Start: 2024-03-22 | End: 2024-03-22

## 2024-03-22 RX ORDER — GLIPIZIDE 5 MG/1
5 TABLET, FILM COATED, EXTENDED RELEASE ORAL
COMMUNITY

## 2024-03-22 RX ORDER — CEFEPIME HYDROCHLORIDE 2 G/50ML
2000 INJECTION, SOLUTION INTRAVENOUS EVERY 12 HOURS
Status: DISCONTINUED | OUTPATIENT
Start: 2024-03-22 | End: 2024-03-25 | Stop reason: HOSPADM

## 2024-03-22 RX ORDER — ONDANSETRON 2 MG/ML
4 INJECTION INTRAMUSCULAR; INTRAVENOUS EVERY 6 HOURS PRN
Status: DISCONTINUED | OUTPATIENT
Start: 2024-03-22 | End: 2024-03-25 | Stop reason: HOSPADM

## 2024-03-22 RX ORDER — OXYCODONE HYDROCHLORIDE AND ACETAMINOPHEN 5; 325 MG/1; MG/1
2 TABLET ORAL EVERY 6 HOURS PRN
Status: DISCONTINUED | OUTPATIENT
Start: 2024-03-22 | End: 2024-03-25 | Stop reason: HOSPADM

## 2024-03-22 RX ORDER — SODIUM CHLORIDE 9 MG/ML
75 INJECTION, SOLUTION INTRAVENOUS CONTINUOUS
Status: DISCONTINUED | OUTPATIENT
Start: 2024-03-22 | End: 2024-03-25 | Stop reason: HOSPADM

## 2024-03-22 RX ORDER — INSULIN LISPRO 100 [IU]/ML
1-6 INJECTION, SOLUTION INTRAVENOUS; SUBCUTANEOUS
Status: DISCONTINUED | OUTPATIENT
Start: 2024-03-22 | End: 2024-03-25 | Stop reason: HOSPADM

## 2024-03-22 RX ORDER — MORPHINE SULFATE 4 MG/ML
4 INJECTION, SOLUTION INTRAMUSCULAR; INTRAVENOUS ONCE
Status: COMPLETED | OUTPATIENT
Start: 2024-03-22 | End: 2024-03-22

## 2024-03-22 RX ADMIN — SODIUM CHLORIDE 75 ML/HR: 0.9 INJECTION, SOLUTION INTRAVENOUS at 16:28

## 2024-03-22 RX ADMIN — VANCOMYCIN HYDROCHLORIDE 1250 MG: 5 INJECTION, POWDER, LYOPHILIZED, FOR SOLUTION INTRAVENOUS at 16:28

## 2024-03-22 RX ADMIN — ONDANSETRON 4 MG: 2 INJECTION INTRAMUSCULAR; INTRAVENOUS at 12:23

## 2024-03-22 RX ADMIN — MORPHINE SULFATE 4 MG: 4 INJECTION INTRAVENOUS at 12:23

## 2024-03-22 RX ADMIN — LOSARTAN POTASSIUM 100 MG: 50 TABLET, FILM COATED ORAL at 16:28

## 2024-03-22 RX ADMIN — INSULIN LISPRO 2 UNITS: 100 INJECTION, SOLUTION INTRAVENOUS; SUBCUTANEOUS at 16:33

## 2024-03-22 RX ADMIN — AMLODIPINE BESYLATE 10 MG: 10 TABLET ORAL at 16:28

## 2024-03-22 RX ADMIN — ATORVASTATIN CALCIUM 10 MG: 10 TABLET, FILM COATED ORAL at 16:28

## 2024-03-22 RX ADMIN — HEPARIN SODIUM 5000 UNITS: 5000 INJECTION, SOLUTION INTRAVENOUS; SUBCUTANEOUS at 21:49

## 2024-03-22 RX ADMIN — VANCOMYCIN HYDROCHLORIDE 1250 MG: 5 INJECTION, POWDER, LYOPHILIZED, FOR SOLUTION INTRAVENOUS at 21:48

## 2024-03-22 RX ADMIN — SODIUM CHLORIDE 1000 ML: 0.9 INJECTION, SOLUTION INTRAVENOUS at 12:19

## 2024-03-22 RX ADMIN — CEFEPIME HYDROCHLORIDE 2000 MG: 2 INJECTION, SOLUTION INTRAVENOUS at 13:23

## 2024-03-22 RX ADMIN — HEPARIN SODIUM 5000 UNITS: 5000 INJECTION, SOLUTION INTRAVENOUS; SUBCUTANEOUS at 16:28

## 2024-03-22 RX ADMIN — FINERENONE 1 TABLET: 10 TABLET, FILM COATED ORAL at 21:48

## 2024-03-22 NOTE — CONSULTS
"[facility]  Consult  Name: Nate Todd 53 y.o. male I MRN: [id number]  Unit/Bed#: PRE/POST 16 I Date of Admission: 3/22/2024   Date of Service: 3/22/2024 I Hospital Day: 0    Inpatient consult to Internal Medicine  Consult performed by: Rema Albert PA-C  Consult ordered by: Doroteo Reis MD  Reason for consult: hypertension, CKD and diabetes          Assessment/Plan   Perirectal abscess  Assessment & Plan  Readmission with worsening perirectal abscess over the past week with foul smell and drainage  Had bedside I&D by surgery team on 3/14 during prior hospitalization  Continue cefepime and vancomycin  Monitor vital signs and fever curve  Pain management  Rest per primary    DM type 2 causing vascular disease (HCC)  Assessment & Plan  Lab Results   Component Value Date    HGBA1C 8.4 (H) 11/13/2023       No results for input(s): \"POCGLU\" in the last 72 hours.    Blood Sugar Average: Last 72 hrs:  Hold home diabetic medications-can be resumed on discharge and stop insulin  Insulin sliding scale and hypoglycemia protocol while inpatient    Stage 1 chronic kidney disease  Assessment & Plan  Lab Results   Component Value Date    EGFR 96 03/22/2024    EGFR 80 03/15/2024    EGFR 81 03/14/2024    CREATININE 0.88 03/22/2024    CREATININE 1.04 03/15/2024    CREATININE 1.03 03/14/2024   Baseline 0.8-1.  Currently at baseline  Avoid hypotension and nephrotoxic agents    Essential hypertension  Assessment & Plan  Continue losartan and amlodipine           VTE Prophylaxis:   Moderate Risk (Score 3-4) - Pharmacological DVT Prophylaxis Ordered: heparin.    Mobility:      -[facility] Goal achieved. Continue to encourage appropriate mobility.    Recommendations for Discharge:  Continue current treatment with insulin sliding scale, antibiotics and antihypertensives.     Total Time Spent on Date of Encounter in care of patient: 60 mins. This time was spent on one or more of the following: performing " physical exam; counseling and coordination of care; obtaining or reviewing history; documenting in the medical record; reviewing/ordering tests, medications or procedures; communicating with other healthcare professionals and discussing with patient's family/caregivers.    Collaboration of Care: Were Recommendations Directly Discussed with Primary Treatment Team? Yes    History of Present Illness:  Nate Todd is a 53 y.o. male who is originally admitted to the surgery service due to perirectal abscess. We are consulted for hypertension, ckd and dm2. Patient states that he has been having fevers, worsening discharge and foul smells from perirectal abscess since discharge from hospital. Patient has been having worsening pain and has been having a difficult time taking care of abscess.     Review of Systems:  Review of Systems   Constitutional:  Positive for fever. Negative for diaphoresis and fatigue.   HENT:  Negative for congestion and sore throat.    Respiratory:  Negative for cough, chest tightness, shortness of breath and wheezing.    Cardiovascular:  Negative for chest pain, palpitations and leg swelling.   Gastrointestinal:  Positive for rectal pain. Negative for abdominal distention, abdominal pain, constipation, diarrhea, nausea and vomiting.   Genitourinary:  Negative for difficulty urinating and dysuria.   Musculoskeletal:  Negative for arthralgias and back pain.   Skin:  Positive for wound.   Neurological:  Negative for dizziness, syncope, weakness, light-headedness, numbness and headaches.   Psychiatric/Behavioral:  Negative for agitation, behavioral problems and confusion.        Past Medical and Surgical History:   Past Medical History:   Diagnosis Date    Chronic kidney disease     Diabetes mellitus (HCC)     High cholesterol     Hypertension        Past Surgical History:   Procedure Laterality Date    ABDOMINAL AORTIC ANEURYSM REPAIR      ADENOIDECTOMY      CHOLECYSTECTOMY      COLON SIGMOID  "RESECTION      COLOSTOMY      HERNIA REPAIR      REVISION COLOSTOMY      TONSILLECTOMY         Meds/Allergies:  all medications and allergies reviewed    Allergies:   Allergies   Allergen Reactions    Latex Rash     \"BLISTER\"    Medical Tape Rash     \"Redness,blister  & swells\"       Social History:  Marital Status: /Civil Union  Substance Use History:   Social History     Substance and Sexual Activity   Alcohol Use Not Currently     Social History     Tobacco Use   Smoking Status Every Day    Current packs/day: 1.00    Types: Cigarettes    Passive exposure: Current   Smokeless Tobacco Never     Social History     Substance and Sexual Activity   Drug Use Not Currently       Family History:  Family History   Problem Relation Age of Onset    Heart disease Mother     Aneurysm Mother     Irregular heart beat Mother     Heart disease Father        Physical Exam:   Vitals:   Blood Pressure: 145/65 (03/22/24 1206)  Pulse: (!) 111 (03/22/24 1206)  Temperature: 97.7 °F (36.5 °C) (03/22/24 1206)  Temp Source: Temporal (03/22/24 1206)  Respirations: 20 (03/22/24 1206)  SpO2: 99 % (03/22/24 1206)    Physical Exam  Vitals reviewed.   Constitutional:       General: He is not in acute distress.     Appearance: Normal appearance. He is obese. He is not ill-appearing.   HENT:      Head: Normocephalic and atraumatic.      Nose: Nose normal.      Mouth/Throat:      Mouth: Mucous membranes are moist.      Pharynx: Oropharynx is clear.   Eyes:      Extraocular Movements: Extraocular movements intact.      Conjunctiva/sclera: Conjunctivae normal.   Cardiovascular:      Rate and Rhythm: Normal rate and regular rhythm.      Pulses: Normal pulses.      Heart sounds: Normal heart sounds. No murmur heard.  Pulmonary:      Effort: Pulmonary effort is normal. No respiratory distress.      Breath sounds: Normal breath sounds. No wheezing.   Abdominal:      General: Abdomen is flat. Bowel sounds are normal. There is no distension.      " Palpations: Abdomen is soft.      Tenderness: There is no abdominal tenderness. There is no guarding.   Musculoskeletal:         General: Normal range of motion.      Cervical back: Normal range of motion.      Right lower leg: No edema.      Left lower leg: No edema.   Skin:     General: Skin is warm.      Findings: Lesion present.      Comments: Perirectal abscess dressing cdi   Neurological:      General: No focal deficit present.      Mental Status: He is alert and oriented to person, place, and time. Mental status is at baseline.      Motor: No weakness.   Psychiatric:         Mood and Affect: Mood normal.         Behavior: Behavior normal.         Thought Content: Thought content normal.         Judgment: Judgment normal.          Additional Data:   Lab Results:    Results from last 7 days   Lab Units 03/22/24  1219   WBC Thousand/uL 11.79*   HEMOGLOBIN g/dL 15.0   HEMATOCRIT % 44.0   PLATELETS Thousands/uL 440*   BANDS PCT % 1   LYMPHO PCT % 22   MONO PCT % 4   EOS PCT % 0     Results from last 7 days   Lab Units 03/22/24  1219   SODIUM mmol/L 134*   POTASSIUM mmol/L 3.7   CHLORIDE mmol/L 99   CO2 mmol/L 28   BUN mg/dL 17   CREATININE mg/dL 0.88   ANION GAP mmol/L 7   CALCIUM mg/dL 8.9   ALBUMIN g/dL 3.6   TOTAL BILIRUBIN mg/dL 0.34   ALK PHOS U/L 109*   ALT U/L 30   AST U/L 10*   GLUCOSE RANDOM mg/dL 248*             Lab Results   Component Value Date/Time    HGBA1C 8.4 (H) 11/13/2023 02:15 PM    HGBA1C 9.1 (H) 06/26/2023 02:39 PM    HGBA1C 8.8 (H) 03/27/2023 02:30 PM         Results from last 7 days   Lab Units 03/22/24  1219   LACTIC ACID mmol/L 1.2       Imaging: No pertinent imaging reviewed.  No orders to display       EKG, Pathology, and Other Studies Reviewed on Admission:   EKG: No EKG obtained.    ** Please Note: This note may have been constructed using a voice recognition system. **

## 2024-03-22 NOTE — ASSESSMENT & PLAN NOTE
Lab Results   Component Value Date    EGFR 96 03/22/2024    EGFR 80 03/15/2024    EGFR 81 03/14/2024    CREATININE 0.88 03/22/2024    CREATININE 1.04 03/15/2024    CREATININE 1.03 03/14/2024   Baseline 0.8-1.  Currently at baseline  Avoid hypotension and nephrotoxic agents

## 2024-03-22 NOTE — ED PROVIDER NOTES
History  Chief Complaint   Patient presents with    Abscess     Recently admitted for the same.  Continues to drain, foul smell. Dark drainage      Patient recently admitted to this hospital on 3/14 for perirectal abscess, had incision and drainage performed by general surgery, discharged the next day on oral antibiotics.  He has been taking the antibiotic.  Today he states that there is increased brown drainage from the area with a foul smell.  He complains of generalized weakness and chills.  He complains of pain in the area.  He complains of nausea.  He denies fevers or vomiting.  He states he had a follow-up appointment today with the surgeon but came to the emergency room instead.      History provided by:  Patient   used: No    Abscess  Abscess location: Perirectal, involving right gluteal area.  Abscess quality: induration and painful    Red streaking: no    Duration:  8 days  Pain details:     Quality:  Dull and pressure    Severity:  Moderate    Timing:  Constant    Progression:  Unchanged  Context: diabetes    Relieved by:  Nothing  Worsened by:  Nothing  Ineffective treatments:  None tried  Associated symptoms: no fever, no headaches, no nausea and no vomiting        Prior to Admission Medications   Prescriptions Last Dose Informant Patient Reported? Taking?   Droplet Pen Needles 32G X 4 MM MISC   Yes No   Sig: use 1 PEN NEEDLE to inject MEDICATION subcutaneously once daily   Empagliflozin 25 MG TABS   Yes No   Sig: Take 25 mg by mouth daily   Kerendia 10 MG TABS   Yes No   Sig: Take 1 tablet by mouth daily   Mounjaro 7.5 MG/0.5ML   Yes No   Si.5 mg   amLODIPine (NORVASC) 10 mg tablet   Yes No   Sig: Take 1 tablet by mouth daily   amoxicillin-clavulanate (AUGMENTIN) 875-125 mg per tablet   No No   Sig: Take 1 tablet by mouth every 12 (twelve) hours for 7 days   atorvastatin (Lipitor) 10 mg tablet   Yes No   Sig: Take 10 mg by mouth daily   losartan (COZAAR) 100 MG tablet   Yes No    Sig: Take 100 mg by mouth daily   metFORMIN (GLUCOPHAGE) 1000 MG tablet   Yes No   Sig: Take 1 tablet by mouth 2 (two) times a day with meals   oxyCODONE-acetaminophen (PERCOCET) 5-325 mg per tablet   No No   Sig: Take 2 tablets by mouth every 6 (six) hours as needed for moderate pain for up to 12 days Max Daily Amount: 8 tablets   pioglitazone (ACTOS) 30 mg tablet   Yes No   Sig: Take 1 tablet by mouth daily      Facility-Administered Medications: None       Past Medical History:   Diagnosis Date    Chronic kidney disease     Diabetes mellitus (HCC)     High cholesterol     Hypertension        Past Surgical History:   Procedure Laterality Date    ABDOMINAL AORTIC ANEURYSM REPAIR      ADENOIDECTOMY      CHOLECYSTECTOMY      COLON SIGMOID RESECTION      COLOSTOMY      HERNIA REPAIR      REVISION COLOSTOMY      TONSILLECTOMY         Family History   Problem Relation Age of Onset    Heart disease Mother     Aneurysm Mother     Irregular heart beat Mother     Heart disease Father      I have reviewed and agree with the history as documented.    E-Cigarette/Vaping     E-Cigarette/Vaping Substances     Social History     Tobacco Use    Smoking status: Every Day     Current packs/day: 1.00     Types: Cigarettes     Passive exposure: Current    Smokeless tobacco: Never   Substance Use Topics    Alcohol use: Not Currently    Drug use: Not Currently       Review of Systems   Constitutional:  Negative for chills and fever.   HENT:  Negative for ear pain, hearing loss, sore throat, trouble swallowing and voice change.    Eyes:  Negative for pain and discharge.   Respiratory:  Negative for cough, shortness of breath and wheezing.    Cardiovascular:  Negative for chest pain and palpitations.   Gastrointestinal:  Negative for abdominal pain, blood in stool, constipation, diarrhea, nausea and vomiting.   Genitourinary:  Negative for dysuria, flank pain, frequency and hematuria.   Musculoskeletal:  Negative for joint swelling,  neck pain and neck stiffness.   Skin:  Negative for rash and wound.   Neurological:  Negative for dizziness, seizures, syncope, facial asymmetry and headaches.   Psychiatric/Behavioral:  Negative for hallucinations, self-injury and suicidal ideas.    All other systems reviewed and are negative.      Physical Exam  Physical Exam  Vitals and nursing note reviewed.   Constitutional:       General: He is not in acute distress.     Appearance: Normal appearance. He is well-developed. He is not ill-appearing or diaphoretic.   HENT:      Head: Normocephalic and atraumatic.      Right Ear: External ear normal.      Left Ear: External ear normal.   Eyes:      General: No scleral icterus.        Right eye: No discharge.         Left eye: No discharge.      Extraocular Movements: Extraocular movements intact.      Conjunctiva/sclera: Conjunctivae normal.   Pulmonary:      Effort: Pulmonary effort is normal. No respiratory distress.   Genitourinary:     Comments: There is a perirectal abscess with a packing in place.  There is brown drainage.  The whole area is indurated and tender.  There is no redness or warmth or streaking.  The perirectal abscess extends into the right gluteal area.  Musculoskeletal:         General: No swelling or deformity. Normal range of motion.      Cervical back: Normal range of motion and neck supple.   Skin:     General: Skin is dry.      Coloration: Skin is not jaundiced or pale.      Findings: No rash.   Neurological:      General: No focal deficit present.      Mental Status: He is alert and oriented to person, place, and time.      Cranial Nerves: No cranial nerve deficit.      Motor: No weakness.      Coordination: Coordination normal.      Gait: Gait normal.   Psychiatric:         Mood and Affect: Mood normal.         Behavior: Behavior normal.         Thought Content: Thought content normal.         Judgment: Judgment normal.         Vital Signs  ED Triage Vitals [03/22/24 1206]   Temperature  Pulse Respirations Blood Pressure SpO2   97.7 °F (36.5 °C) (!) 111 20 145/65 99 %      Temp Source Heart Rate Source Patient Position - Orthostatic VS BP Location FiO2 (%)   Temporal Monitor Lying -- --      Pain Score       4           Vitals:    03/22/24 1206   BP: 145/65   Pulse: (!) 111   Patient Position - Orthostatic VS: Lying         Visual Acuity      ED Medications  Medications   sodium chloride 0.9 % bolus 1,000 mL (1,000 mL Intravenous New Bag 3/22/24 1219)   vancomycin (VANCOCIN) IVPB (premix in dextrose) 1,000 mg 200 mL (has no administration in time range)   cefepime (MAXIPIME) IVPB (premix in dextrose) 2,000 mg 50 mL (has no administration in time range)   ondansetron (ZOFRAN) injection 4 mg (4 mg Intravenous Given 3/22/24 1223)   morphine injection 4 mg (4 mg Intravenous Given 3/22/24 1223)       Diagnostic Studies  Results Reviewed       Procedure Component Value Units Date/Time    Manual Differential(PHLEBS Do Not Order) [[id number]]  (Abnormal) Collected: 03/22/24 1219    Lab Status: Final result Specimen: Blood from Arm, Right Updated: 03/22/24 1258     Segmented % 72 %      Bands % 1 %      Lymphocytes % 22 %      Monocytes % 4 %      Eosinophils % 0 %      Basophils % 0 %      Myelocytes % 1 %      Absolute Neutrophils 8.61 Thousand/uL      Absolute Lymphocytes 2.59 Thousand/uL      Absolute Monocytes 0.47 Thousand/uL      Absolute Eosinophils 0.00 Thousand/uL      Absolute Basophils 0.00 Thousand/uL      Total Counted --     RBC Morphology Normal     Platelet Estimate Increased     Large Platelet Present     Anisocytosis Present    Comprehensive metabolic panel [[id number]]  (Abnormal) Collected: 03/22/24 1219    Lab Status: Final result Specimen: Blood from Arm, Right Updated: 03/22/24 1242     Sodium 134 mmol/L      Potassium 3.7 mmol/L      Chloride 99 mmol/L      CO2 28 mmol/L      ANION GAP 7 mmol/L      BUN 17 mg/dL      Creatinine 0.88 mg/dL      Glucose 248 mg/dL      Calcium 8.9 mg/dL       AST 10 U/L      ALT 30 U/L      Alkaline Phosphatase 109 U/L      Total Protein 7.8 g/dL      Albumin 3.6 g/dL      Total Bilirubin 0.34 mg/dL      eGFR 96 ml/min/1.73sq m     Narrative:      [employer] guidelines for Chronic Kidney Disease (CKD):     Stage 1 with normal or high GFR (GFR > 90 mL/min/1.73 square meters)    Stage 2 Mild CKD (GFR = 60-89 mL/min/1.73 square meters)    Stage 3A Moderate CKD (GFR = 45-59 mL/min/1.73 square meters)    Stage 3B Moderate CKD (GFR = 30-44 mL/min/1.73 square meters)    Stage 4 Severe CKD (GFR = 15-29 mL/min/1.73 square meters)    Stage 5 End Stage CKD (GFR <15 mL/min/1.73 square meters)  Note: GFR calculation is accurate only with a steady state creatinine    Lactic acid, plasma (w/reflex if result > 2.0) [[id number]]  (Normal) Collected: 03/22/24 1219    Lab Status: Final result Specimen: Blood from Arm, Right Updated: 03/22/24 1242     LACTIC ACID 1.2 mmol/L     Narrative:      Result may be elevated if tourniquet was used during collection.    Beta Hydroxybutyrate [[id number]]  (Normal) Collected: 03/22/24 1219    Lab Status: Final result Specimen: Blood from Arm, Right Updated: 03/22/24 1242     Beta- Hydroxybutyrate <0.05 mmol/L     CBC and differential [[id number]]  (Abnormal) Collected: 03/22/24 1219    Lab Status: Final result Specimen: Blood from Arm, Right Updated: 03/22/24 1226     WBC 11.79 Thousand/uL      RBC 5.00 Million/uL      Hemoglobin 15.0 g/dL      Hematocrit 44.0 %      MCV 88 fL      MCH 30.0 pg      MCHC 34.1 g/dL      RDW 12.4 %      MPV 8.1 fL      Platelets 440 Thousands/uL                    No orders to display              Procedures  Procedures         ED Course  ED Course as of 03/22/24 1310   Fri Mar 22, 2024   1221 General surgery, Dr. Reis, contacted, will come evaluate patient in ED.   1242 Dr. Reis currently here to evaluate patient   1308 Seen and examined in ED by Dr. Reis, recommends IV antibiotics and  admission to the surgical service                                             Medical Decision Making  Patient presents to the emergency department with recurrent perirectal abscess with symptoms of chills, malaise, generalized weakness.      Based on the MSE and the history provided, diagnostic considerations include but are not limited to perirectal abscess, cellulitis, Theo's gangrene    Based on the work-up performed in the emergency department which includes physical examination, laboratory testing, imaging which may include advanced imaging as necessary such as CT scan or ultrasound, it is deemed that the patient will require admission to the hospital for treatment of perirectal abscess.      Amount and/or Complexity of Data Reviewed  Labs: ordered. Decision-making details documented in ED Course.     Details: Mild leukocytosis, mild hyperglycemia without evidence of DKA  Discussion of management or test interpretation with external provider(s): On-call general surgery, Dr. Reis, recommends IV antibiotics and admission to the general surgical service    Risk  Prescription drug management.  Decision regarding hospitalization.             Disposition  Final diagnoses:   Perirectal abscess     Time reflects when diagnosis was documented in both MDM as applicable and the Disposition within this note       Time User Action Codes Description Comment    3/22/2024  1:08 PM Nato Wilburn Add [K61.1] Perirectal abscess           ED Disposition       ED Disposition   Admit    Condition   Stable    Date/Time   Fri Mar 22, 2024  1:08 PM    Comment   --             Follow-up Information    None         Patient's Medications   Discharge Prescriptions    No medications on file       No discharge procedures on file.    PDMP Review       None            ED Provider  Electronically Signed by             Nato Wilburn MD  03/22/24 [id number]

## 2024-03-22 NOTE — PLAN OF CARE
Problem: Potential for Falls  Goal: Patient will remain free of falls  Description: INTERVENTIONS:  - Educate patient/family on patient safety including physical limitations  - Instruct patient to call for assistance with activity   - Consult OT/PT to assist with strengthening/mobility   - Keep Call bell within reach  - Keep bed low and locked with side rails adjusted as appropriate  - Keep care items and personal belongings within reach  - Initiate and maintain comfort rounds  - Make Fall Risk Sign visible to staff    - Apply yellow socks and bracelet for high fall risk patients  - Consider moving patient to room near nurses station  Outcome: Progressing     Problem: PAIN - ADULT  Goal: Verbalizes/displays adequate comfort level or baseline comfort level  Description: Interventions:  - Encourage patient to monitor pain and request assistance  - Assess pain using appropriate pain scale  - Administer analgesics based on type and severity of pain and evaluate response  - Implement non-pharmacological measures as appropriate and evaluate response  - Consider cultural and social influences on pain and pain management  - Notify physician/advanced practitioner if interventions unsuccessful or patient reports new pain  Outcome: Progressing     Problem: INFECTION - ADULT  Goal: Absence or prevention of progression during hospitalization  Description: INTERVENTIONS:  - Assess and monitor for signs and symptoms of infection  - Monitor lab/diagnostic results  - Monitor all insertion sites, i.e. indwelling lines, tubes, and drains  - Monitor endotracheal if appropriate and nasal secretions for changes in amount and color  - [place] appropriate cooling/warming therapies per order  - Administer medications as ordered  - Instruct and encourage patient and family to use good hand hygiene technique  - Identify and instruct in appropriate isolation precautions for identified infection/condition  Outcome: Progressing     Problem:  SAFETY ADULT  Goal: Patient will remain free of falls  Description: INTERVENTIONS:  - Educate patient/family on patient safety including physical limitations  - Instruct patient to call for assistance with activity   - Consult OT/PT to assist with strengthening/mobility   - Keep Call bell within reach  - Keep bed low and locked with side rails adjusted as appropriate  - Keep care items and personal belongings within reach  - Initiate and maintain comfort rounds    - Apply yellow socks and bracelet for high fall risk patients  - Consider moving patient to room near nurses station  Outcome: Progressing  Goal: Maintain or return to baseline ADL function  Description: INTERVENTIONS:  -  Assess patient's ability to carry out ADLs; assess patient's baseline for ADL function and identify physical deficits which impact ability to perform ADLs (bathing, care of mouth/teeth, toileting, grooming, dressing, etc.)  - Assess/evaluate cause of self-care deficits   - Assess range of motion  - Assess patient's mobility; develop plan if impaired  - Assess patient's need for assistive devices and provide as appropriate  - Encourage maximum independence but intervene and supervise when necessary  - Involve family in performance of ADLs  - Assess for home care needs following discharge   - Consider OT consult to assist with ADL evaluation and planning for discharge  - Provide patient education as appropriate  Outcome: Progressing  Goal: Maintains/Returns to pre admission functional level  Description: INTERVENTIONS:  - Perform AM-PAC 6 Click Basic Mobility/ Daily Activity assessment daily.  - Set and communicate daily mobility goal to care team and patient/family/caregiver.   - Collaborate with rehabilitation services on mobility goals if consulted    - Out of bed for toileting  - Record patient progress and toleration of activity level   Outcome: Progressing     Problem: DISCHARGE PLANNING  Goal: Discharge to home or other facility  with appropriate resources  Description: INTERVENTIONS:  - Identify barriers to discharge w/patient and caregiver  - Arrange for needed discharge resources and transportation as appropriate  - Identify discharge learning needs (meds, wound care, etc.)  - Arrange for interpretive services to assist at discharge as needed  - Refer to Case Management Department for coordinating discharge planning if the patient needs post-hospital services based on physician/advanced practitioner order or complex needs related to functional status, cognitive ability, or social support system  Outcome: Progressing     Problem: Knowledge Deficit  Goal: Patient/family/caregiver demonstrates understanding of disease process, treatment plan, medications, and discharge instructions  Description: Complete learning assessment and assess knowledge base.  Interventions:  - Provide teaching at level of understanding  - Provide teaching via preferred learning methods  Outcome: Progressing     Problem: SKIN/TISSUE INTEGRITY - ADULT  Goal: Skin Integrity remains intact(Skin Breakdown Prevention)  Description: Assess:    -Assess extremities for adequate circulation and sensation     Bed Management:  -Have minimal linens on bed & keep smooth, unwrinkled  -Change linens as needed when moist or perspiring            Skin Care:  -Avoid use of baby powder, tape, friction and shearing, hot water or constrictive clothing    Outcome: Progressing  Goal: Incision(s), wounds(s) or drain site(s) healing without S/S of infection  Description: INTERVENTIONS  - Assess and document dressing, incision, wound bed, drain sites and surrounding tissue  - Provide patient and family education  -  Outcome: Progressing     Problem: MOBILITY - ADULT  Goal: Maintain or return to baseline ADL function  Description: INTERVENTIONS:  -  Assess patient's ability to carry out ADLs; assess patient's baseline for ADL function and identify physical deficits which impact ability to  perform ADLs (bathing, care of mouth/teeth, toileting, grooming, dressing, etc.)  - Assess/evaluate cause of self-care deficits   - Assess range of motion  - Assess patient's mobility; develop plan if impaired  - Assess patient's need for assistive devices and provide as appropriate  - Encourage maximum independence but intervene and supervise when necessary  - Involve family in performance of ADLs  - Assess for home care needs following discharge   - Consider OT consult to assist with ADL evaluation and planning for discharge  - Provide patient education as appropriate  Outcome: Progressing  Goal: Maintains/Returns to pre admission functional level  Description: INTERVENTIONS:  - Perform AM-PAC 6 Click Basic Mobility/ Daily Activity assessment daily.  - Set and communicate daily mobility goal to care team and patient/family/caregiver.   - Collaborate with rehabilitation services on mobility goals if consulted    - Out of bed for toileting  - Record patient progress and toleration of activity level   Outcome: Progressing

## 2024-03-22 NOTE — ASSESSMENT & PLAN NOTE
"Lab Results   Component Value Date    HGBA1C 8.4 (H) 11/13/2023       No results for input(s): \"POCGLU\" in the last 72 hours.    Blood Sugar Average: Last 72 hrs:  Hold home diabetic medications-can be resumed on discharge and stop insulin  Insulin sliding scale and hypoglycemia protocol while inpatient  "

## 2024-03-22 NOTE — PROGRESS NOTES
Nate Todd is a 53 y.o. male who is currently ordered Vancomycin IV with management by the Pharmacy Consult service.  Relevant clinical data and objective / subjective history reviewed.  Vancomycin Assessment:  Indication and Goal AUC/Trough: Soft tissue (goal -600, trough >10)  Clinical Status:  new start  Micro:   No Results yet   Renal Function:  SCr: 0.88 mg/dL  CrCl: 114 mL/min  Renal replacement: Not on dialysis  Days of Therapy: 1  Current Dose: new start 1250mg IV Q12H  Vancomycin Plan:  New Dosinmg IV Q12H  Estimated AUC: 455 mcg*hr/mL  Estimated Trough: 12.2 mcg/mL  Next Level: 3/24/24 @ 0600  Renal Function Monitoring: Daily BMP and UOP  Pharmacy will continue to follow closely for s/sx of nephrotoxicity, infusion reactions and appropriateness of therapy.  BMP and CBC will be ordered per protocol. We will continue to follow the patient’s culture results and clinical progress daily.    Inna Beckman, Pharmacist

## 2024-03-22 NOTE — H&P
"H&P Exam - General Surgery   Nate Todd 53 y.o. male MRN: [id number]  Unit/Bed#: PRE/POST 16 Encounter: [id number]    Assessment/Plan     Assessment:  The patient is a 52 yo WM with hx of DM, 6 days out from I & D of perirectal abscess in [facility] ER presenting with ongoing and escalating pain, foul smelling drainage and sensation of residual lump adjacent to draining wound from I & D.  Pt not aware of his blood sugars, not eating well, has only moved his bowels once.  Exam is significant for extensive soft tissue infection of perineum, but no crepitus or fluctuance to suggest undrained abscess or Theo's Gangrene.     Plan:  Admit for broad spectrum antibiotics, wound care, monitor for progression to necrotizing infection.    History of Present Illness   HPI:  Nate Todd is a 53 y.o. male with significant vascular disease s/p AAA repair, NIDDM, Chronic Renal Insuffiencywho apparently declined admission following drainage presents with pain, swelling and ongoing foul drainage from perineal wound.  Relates I & D in ER, discharge from ER on Augmentin, VNA and family has been packing the wound.  Pt notes \"lump\" adjacent to the wound from indurated tissue.  Returns to ER, significant induration and purulence noted, no fluctuance or crepitance noted.     Review of Systems   Constitutional:  Positive for appetite change. Negative for chills and fever.   HENT: Negative.     Respiratory: Negative.     Cardiovascular: Negative.    Gastrointestinal:  Positive for constipation and rectal pain. Negative for nausea and vomiting.   Endocrine:        Does not check his own blood sugars   Genitourinary:  Negative for difficulty urinating.   Musculoskeletal: Negative.    Skin:  Positive for wound.   Neurological: Negative.    Psychiatric/Behavioral:  The patient is nervous/anxious.        Historical Information   Past Medical History:   Diagnosis Date    Chronic kidney disease     Diabetes mellitus (HCC)     High " cholesterol     Hypertension      Past Surgical History:   Procedure Laterality Date    ABDOMINAL AORTIC ANEURYSM REPAIR      ADENOIDECTOMY      CHOLECYSTECTOMY      COLON SIGMOID RESECTION      COLOSTOMY      HERNIA REPAIR      REVISION COLOSTOMY      TONSILLECTOMY       Social History   Social History     Substance and Sexual Activity   Alcohol Use Not Currently     Social History     Substance and Sexual Activity   Drug Use Not Currently     Social History     Tobacco Use   Smoking Status Every Day    Current packs/day: 1.00    Types: Cigarettes    Passive exposure: Current   Smokeless Tobacco Never     E-Cigarette/Vaping     E-Cigarette/Vaping Substances     Family History: non-contributory    Meds/Allergies   PTA meds:   Prior to Admission Medications   Prescriptions Last Dose Informant Patient Reported? Taking?   Droplet Pen Needles 32G X 4 MM MISC   Yes No   Sig: use 1 PEN NEEDLE to inject MEDICATION subcutaneously once daily   Empagliflozin 25 MG TABS   Yes No   Sig: Take 25 mg by mouth daily   Kerendia 10 MG TABS   Yes No   Sig: Take 1 tablet by mouth daily   Mounjaro 7.5 MG/0.5ML   Yes No   Si.5 mg   amLODIPine (NORVASC) 10 mg tablet   Yes No   Sig: Take 1 tablet by mouth daily   amoxicillin-clavulanate (AUGMENTIN) 875-125 mg per tablet   No No   Sig: Take 1 tablet by mouth every 12 (twelve) hours for 7 days   atorvastatin (Lipitor) 10 mg tablet   Yes No   Sig: Take 10 mg by mouth daily   losartan (COZAAR) 100 MG tablet   Yes No   Sig: Take 100 mg by mouth daily   metFORMIN (GLUCOPHAGE) 1000 MG tablet   Yes No   Sig: Take 1 tablet by mouth 2 (two) times a day with meals   oxyCODONE-acetaminophen (PERCOCET) 5-325 mg per tablet   No No   Sig: Take 2 tablets by mouth every 6 (six) hours as needed for moderate pain for up to 12 days Max Daily Amount: 8 tablets   pioglitazone (ACTOS) 30 mg tablet   Yes No   Sig: Take 1 tablet by mouth daily      Facility-Administered Medications: None     Allergies  "  Allergen Reactions    Latex Rash     \"BLISTER\"    Medical Tape Rash     \"Redness,blister  & swells\"       Objective   First Vitals:   Blood Pressure: 145/65 (03/22/24 1206)  Pulse: (!) 111 (03/22/24 1206)  Temperature: 97.7 °F (36.5 °C) (03/22/24 1206)  Temp Source: Temporal (03/22/24 1206)  Respirations: 20 (03/22/24 1206)  SpO2: 99 % (03/22/24 1206)    Current Vitals:   Blood Pressure: 145/65 (03/22/24 1206)  Pulse: (!) 111 (03/22/24 1206)  Temperature: 97.7 °F (36.5 °C) (03/22/24 1206)  Temp Source: Temporal (03/22/24 1206)  Respirations: 20 (03/22/24 1206)  SpO2: 99 % (03/22/24 1206)    No intake or output data in the 24 hours ending 03/22/24 1319    Invasive Devices       Peripheral Intravenous Line  Duration             Peripheral IV 03/22/24 Right Antecubital <1 day                    Physical Exam  Constitutional:       Appearance: He is ill-appearing. He is not toxic-appearing.   HENT:      Head: Normocephalic.   Eyes:      General: No scleral icterus.     Pupils: Pupils are equal, round, and reactive to light.   Cardiovascular:      Rate and Rhythm: Normal rate and regular rhythm.      Heart sounds: Normal heart sounds.   Pulmonary:      Effort: Pulmonary effort is normal.      Breath sounds: Normal breath sounds.   Abdominal:      General: A surgical scar is present. Bowel sounds are normal.      Palpations: Abdomen is soft.      Tenderness: There is no abdominal tenderness.      Hernia: A hernia is present. Hernia is present in the ventral area.       Genitourinary:         Comments: Gaping wound with induration, foul smelling drainage        Lab Results: CBC:   Lab Results   Component Value Date    WBC 11.79 (H) 03/22/2024    HGB 15.0 03/22/2024    HCT 44.0 03/22/2024    MCV 88 03/22/2024     (H) 03/22/2024    RBC 5.00 03/22/2024    MCH 30.0 03/22/2024    MCHC 34.1 03/22/2024    RDW 12.4 03/22/2024    MPV 8.1 (L) 03/22/2024   , CMP:   Lab Results   Component Value Date    SODIUM 134 (L) " "03/22/2024    K 3.7 03/22/2024    CL 99 03/22/2024    CO2 28 03/22/2024    BUN 17 03/22/2024    CREATININE 0.88 03/22/2024    CALCIUM 8.9 03/22/2024    AST 10 (L) 03/22/2024    ALT 30 03/22/2024    ALKPHOS 109 (H) 03/22/2024    EGFR 96 03/22/2024   , Urinalysis: No results found for: \"COLORU\", \"CLARITYU\", \"SPECGRAV\", \"PHUR\", \"LEUKOCYTESUR\", \"NITRITE\", \"PROTEINUA\", \"GLUCOSEU\", \"KETONESU\", \"BILIRUBINUR\", \"BLOODU\"  Imaging: I have personally reviewed pertinent reports.   and I have personally reviewed pertinent films in PACS  EKG, Pathology, and Other Studies: I have personally reviewed pertinent reports.   and I have personally reviewed pertinent films in PACS    Code Status: Level 1 - Full Code  Advance Directive and Living Will:      Power of :    POLST:      Counseling / Coordination of Care  Total floor / unit time spent today 25 minutes.  Greater than 50% of total time was spent with the patient and / or family counseling and / or coordination of care.  A description of the counseling / coordination of care: Discussion with ER.      "

## 2024-03-23 LAB
ANION GAP SERPL CALCULATED.3IONS-SCNC: 6 MMOL/L (ref 4–13)
BUN SERPL-MCNC: 15 MG/DL (ref 5–25)
CALCIUM SERPL-MCNC: 8.3 MG/DL (ref 8.4–10.2)
CHLORIDE SERPL-SCNC: 106 MMOL/L (ref 96–108)
CO2 SERPL-SCNC: 25 MMOL/L (ref 21–32)
CREAT SERPL-MCNC: 0.7 MG/DL (ref 0.6–1.3)
ERYTHROCYTE [DISTWIDTH] IN BLOOD BY AUTOMATED COUNT: 12.5 % (ref 11.6–15.1)
GFR SERPL CREATININE-BSD FRML MDRD: 106 ML/MIN/1.73SQ M
GLUCOSE SERPL-MCNC: 143 MG/DL (ref 65–140)
GLUCOSE SERPL-MCNC: 150 MG/DL (ref 65–140)
GLUCOSE SERPL-MCNC: 158 MG/DL (ref 65–140)
GLUCOSE SERPL-MCNC: 169 MG/DL (ref 65–140)
GLUCOSE SERPL-MCNC: 175 MG/DL (ref 65–140)
HCT VFR BLD AUTO: 37.5 % (ref 36.5–49.3)
HGB BLD-MCNC: 12.4 G/DL (ref 12–17)
MCH RBC QN AUTO: 29.6 PG (ref 26.8–34.3)
MCHC RBC AUTO-ENTMCNC: 33.1 G/DL (ref 31.4–37.4)
MCV RBC AUTO: 90 FL (ref 82–98)
PLATELET # BLD AUTO: 393 THOUSANDS/UL (ref 149–390)
PMV BLD AUTO: 8.3 FL (ref 8.9–12.7)
POTASSIUM SERPL-SCNC: 3.9 MMOL/L (ref 3.5–5.3)
RBC # BLD AUTO: 4.19 MILLION/UL (ref 3.88–5.62)
SODIUM SERPL-SCNC: 137 MMOL/L (ref 135–147)
WBC # BLD AUTO: 11.2 THOUSAND/UL (ref 4.31–10.16)

## 2024-03-23 PROCEDURE — 85027 COMPLETE CBC AUTOMATED: CPT | Performed by: SPECIALIST

## 2024-03-23 PROCEDURE — 99024 POSTOP FOLLOW-UP VISIT: CPT | Performed by: PHYSICIAN ASSISTANT

## 2024-03-23 PROCEDURE — 82948 REAGENT STRIP/BLOOD GLUCOSE: CPT

## 2024-03-23 PROCEDURE — 99232 SBSQ HOSP IP/OBS MODERATE 35: CPT

## 2024-03-23 PROCEDURE — 80048 BASIC METABOLIC PNL TOTAL CA: CPT | Performed by: SPECIALIST

## 2024-03-23 RX ORDER — VANCOMYCIN HYDROCHLORIDE 1 G/200ML
1000 INJECTION, SOLUTION INTRAVENOUS EVERY 8 HOURS
Status: DISCONTINUED | OUTPATIENT
Start: 2024-03-23 | End: 2024-03-25 | Stop reason: HOSPADM

## 2024-03-23 RX ADMIN — SODIUM CHLORIDE 75 ML/HR: 0.9 INJECTION, SOLUTION INTRAVENOUS at 09:01

## 2024-03-23 RX ADMIN — INSULIN LISPRO 1 UNITS: 100 INJECTION, SOLUTION INTRAVENOUS; SUBCUTANEOUS at 08:27

## 2024-03-23 RX ADMIN — CEFEPIME HYDROCHLORIDE 2000 MG: 2 INJECTION, SOLUTION INTRAVENOUS at 00:40

## 2024-03-23 RX ADMIN — CEFEPIME HYDROCHLORIDE 2000 MG: 2 INJECTION, SOLUTION INTRAVENOUS at 14:06

## 2024-03-23 RX ADMIN — LOSARTAN POTASSIUM 100 MG: 50 TABLET, FILM COATED ORAL at 08:22

## 2024-03-23 RX ADMIN — FINERENONE 1 TABLET: 10 TABLET, FILM COATED ORAL at 08:23

## 2024-03-23 RX ADMIN — HEPARIN SODIUM 5000 UNITS: 5000 INJECTION, SOLUTION INTRAVENOUS; SUBCUTANEOUS at 14:06

## 2024-03-23 RX ADMIN — DOCUSATE SODIUM 100 MG: 100 CAPSULE, LIQUID FILLED ORAL at 17:27

## 2024-03-23 RX ADMIN — ATORVASTATIN CALCIUM 10 MG: 10 TABLET, FILM COATED ORAL at 08:22

## 2024-03-23 RX ADMIN — DOCUSATE SODIUM 100 MG: 100 CAPSULE, LIQUID FILLED ORAL at 08:22

## 2024-03-23 RX ADMIN — VANCOMYCIN HYDROCHLORIDE 1250 MG: 5 INJECTION, POWDER, LYOPHILIZED, FOR SOLUTION INTRAVENOUS at 08:55

## 2024-03-23 RX ADMIN — AMLODIPINE BESYLATE 10 MG: 10 TABLET ORAL at 08:22

## 2024-03-23 RX ADMIN — HEPARIN SODIUM 5000 UNITS: 5000 INJECTION, SOLUTION INTRAVENOUS; SUBCUTANEOUS at 21:23

## 2024-03-23 RX ADMIN — SODIUM CHLORIDE 75 ML/HR: 0.9 INJECTION, SOLUTION INTRAVENOUS at 21:22

## 2024-03-23 RX ADMIN — HEPARIN SODIUM 5000 UNITS: 5000 INJECTION, SOLUTION INTRAVENOUS; SUBCUTANEOUS at 06:00

## 2024-03-23 RX ADMIN — INSULIN LISPRO 1 UNITS: 100 INJECTION, SOLUTION INTRAVENOUS; SUBCUTANEOUS at 11:06

## 2024-03-23 RX ADMIN — VANCOMYCIN HYDROCHLORIDE 1000 MG: 1 INJECTION, SOLUTION INTRAVENOUS at 17:28

## 2024-03-23 NOTE — ASSESSMENT & PLAN NOTE
Lab Results   Component Value Date    HGBA1C 8.4 (H) 11/13/2023       Recent Labs     03/22/24  1558 03/22/24  2124 03/23/24  0744 03/23/24  1058   POCGLU 196* 179* 169* 158*       Blood Sugar Average: Last 72 hrs:  (P) 175.5Hold home diabetic medications-can be resumed on discharge and stop insulin  Insulin sliding scale and hypoglycemia protocol while inpatient

## 2024-03-23 NOTE — ASSESSMENT & PLAN NOTE
Lab Results   Component Value Date    EGFR 106 03/23/2024    EGFR 96 03/22/2024    EGFR 80 03/15/2024    CREATININE 0.70 03/23/2024    CREATININE 0.88 03/22/2024    CREATININE 1.04 03/15/2024   Baseline 0.8-1.  Currently at baseline  Avoid hypotension and nephrotoxic agents

## 2024-03-23 NOTE — PROGRESS NOTES
Nate Todd is a 53 y.o. male who is currently ordered Vancomycin IV with management by the Pharmacy Consult service.  Relevant clinical data and objective / subjective history reviewed.  Vancomycin Assessment:  Indication and Goal AUC/Trough: Soft tissue (goal -600, trough >10)  Clinical Status: stable  Micro:   No results  Renal Function:  SCr: 0.7 mg/dL  CrCl: 143.4 mL/min  Renal replacement: Not on dialysis  Days of Therapy: 2  Current Dose: 1250 mg IV every 12 hours  Vancomycin Plan:  New Dosin mg IV every 8 hours  Estimated AUC: 453 mcg*hr/mL  Estimated Trough: 13.4 mcg/mL  Next Level: 3/24/24 @ 0600  Renal Function Monitoring: Daily BMP and UOP  Pharmacy will continue to follow closely for s/sx of nephrotoxicity, infusion reactions and appropriateness of therapy.  BMP and CBC will be ordered per protocol. We will continue to follow the patient’s culture results and clinical progress daily.    Luis Love, Pharmacist

## 2024-03-23 NOTE — PROGRESS NOTES
[facility]  Progress Note  Name: Nate Todd I  MRN: [id number]  Unit/Bed#: MS 318Hayley I Date of Admission: 3/22/2024   Date of Service: 3/23/2024 I Hospital Day: 1    Assessment/Plan   * Perirectal abscess  Assessment & Plan  Readmission with worsening perirectal abscess over the past week with foul smell and drainage  Had bedside I&D by surgery team on 3/14 during prior hospitalization  Continue cefepime and vancomycin  Monitor vital signs and fever curve  Pain management  Rest per primary    DM type 2 causing vascular disease (HCC)  Assessment & Plan  Lab Results   Component Value Date    HGBA1C 8.4 (H) 11/13/2023       Recent Labs     03/22/24  1558 03/22/24  2124 03/23/24  0744 03/23/24  1058   POCGLU 196* 179* 169* 158*       Blood Sugar Average: Last 72 hrs:  (P) 175.5Hold home diabetic medications-can be resumed on discharge and stop insulin  Insulin sliding scale and hypoglycemia protocol while inpatient    Stage 1 chronic kidney disease  Assessment & Plan  Lab Results   Component Value Date    EGFR 106 03/23/2024    EGFR 96 03/22/2024    EGFR 80 03/15/2024    CREATININE 0.70 03/23/2024    CREATININE 0.88 03/22/2024    CREATININE 1.04 03/15/2024   Baseline 0.8-1.  Currently at baseline  Avoid hypotension and nephrotoxic agents    Essential hypertension  Assessment & Plan  Continue losartan and amlodipine               VTE Pharmacologic Prophylaxis:   Moderate Risk (Score 3-4) - Pharmacological DVT Prophylaxis Ordered: heparin.    Mobility:   Basic Mobility Inpatient Raw Score: 24  JH-HLM Goal: 8: Walk 250 feet or more  JH-HLM Achieved: 8: Walk 250 feet ot more  JH-HLM Goal achieved. Continue to encourage appropriate mobility.    Patient Centered Rounds: I performed bedside rounds with nursing staff today.   Discussions with Specialists or Other Care Team Provider: nursing and cm    Education and Discussions with Family / Patient:  per primary.     Total Time Spent on Date of  Encounter in care of patient:  mins. This time was spent on one or more of the following: performing physical exam; counseling and coordination of care; obtaining or reviewing history; documenting in the medical record; reviewing/ordering tests, medications or procedures; communicating with other healthcare professionals and discussing with patient's family/caregivers.    Current Length of Stay: 1 day(s)  Current Patient Status: Inpatient   Certification Statement: The patient will continue to require additional inpatient hospital stay due to requiring iv antibiotics for perirectal abscess  Discharge Plan:  pending primary    Code Status: Level 1 - Full Code    Subjective:   Patient states that he is still having pain but otherwise denies chest pain and shortness of breath. Does not offer any further complaints at this time.     Objective:     Vitals:   Temp (24hrs), Av.6 °F (36.4 °C), Min:97.5 °F (36.4 °C), Max:97.7 °F (36.5 °C)    Temp:  [97.5 °F (36.4 °C)-97.7 °F (36.5 °C)] 97.5 °F (36.4 °C)  HR:  [] 81  Resp:  [17-20] 17  BP: (119-168)/(65-86) 119/69  SpO2:  [91 %-99 %] 95 %  Body mass index is 28.75 kg/m².     Input and Output Summary (last 24 hours):     Intake/Output Summary (Last 24 hours) at 3/23/2024 1142  Last data filed at 3/23/2024 0902  Gross per 24 hour   Intake 500 ml   Output 1600 ml   Net -1100 ml       Physical Exam:   Physical Exam  Vitals reviewed.   Constitutional:       General: He is not in acute distress.     Appearance: Normal appearance. He is obese. He is not ill-appearing.   HENT:      Head: Normocephalic and atraumatic.      Nose: Nose normal.      Mouth/Throat:      Mouth: Mucous membranes are moist.      Pharynx: Oropharynx is clear.   Eyes:      Extraocular Movements: Extraocular movements intact.      Conjunctiva/sclera: Conjunctivae normal.   Cardiovascular:      Rate and Rhythm: Normal rate and regular rhythm.      Pulses: Normal pulses.      Heart sounds: Normal heart  sounds. No murmur heard.  Pulmonary:      Effort: Pulmonary effort is normal. No respiratory distress.      Breath sounds: Normal breath sounds. No wheezing.   Abdominal:      General: Abdomen is flat. Bowel sounds are normal. There is no distension.      Palpations: Abdomen is soft.      Tenderness: There is no abdominal tenderness. There is no guarding.   Musculoskeletal:         General: Normal range of motion.      Cervical back: Normal range of motion.      Right lower leg: No edema.      Left lower leg: No edema.   Skin:     General: Skin is warm.      Comments: Perirectal dressing cdi   Neurological:      General: No focal deficit present.      Mental Status: He is alert and oriented to person, place, and time. Mental status is at baseline.      Motor: No weakness.   Psychiatric:         Mood and Affect: Mood normal.         Behavior: Behavior normal.         Thought Content: Thought content normal.         Judgment: Judgment normal.          Additional Data:     Labs:  Results from last 7 days   Lab Units 03/23/24  0453 03/22/24  1219   WBC Thousand/uL 11.20* 11.79*   HEMOGLOBIN g/dL 12.4 15.0   HEMATOCRIT % 37.5 44.0   PLATELETS Thousands/uL 393* 440*   BANDS PCT %  --  1   LYMPHO PCT %  --  22   MONO PCT %  --  4   EOS PCT %  --  0     Results from last 7 days   Lab Units 03/23/24  0453 03/22/24  1219   SODIUM mmol/L 137 134*   POTASSIUM mmol/L 3.9 3.7   CHLORIDE mmol/L 106 99   CO2 mmol/L 25 28   BUN mg/dL 15 17   CREATININE mg/dL 0.70 0.88   ANION GAP mmol/L 6 7   CALCIUM mg/dL 8.3* 8.9   ALBUMIN g/dL  --  3.6   TOTAL BILIRUBIN mg/dL  --  0.34   ALK PHOS U/L  --  109*   ALT U/L  --  30   AST U/L  --  10*   GLUCOSE RANDOM mg/dL 150* 248*         Results from last 7 days   Lab Units 03/23/24  1058 03/23/24  0744 03/22/24  2124 03/22/24  1558   POC GLUCOSE mg/dl 158* 169* 179* 196*         Results from last 7 days   Lab Units 03/22/24  1219   LACTIC ACID mmol/L 1.2       Lines/Drains:  Invasive Devices        Peripheral Intravenous Line  Duration             Peripheral IV 03/22/24 Right Antecubital <1 day                          Imaging: No pertinent imaging reviewed.    Recent Cultures (last 7 days):         Last 24 Hours Medication List:   Current Facility-Administered Medications   Medication Dose Route Frequency Provider Last Rate    amLODIPine  10 mg Oral Daily Doroteo Reis MD      atorvastatin  10 mg Oral Daily Doroteo Reis MD      cefepime  2,000 mg Intravenous Q12H Doroteo Reis MD 2,000 mg (03/23/24 0040)    docusate sodium  100 mg Oral BID Doroteo Reis MD      Finerenone  1 tablet Oral Daily Doroteo Reis MD      heparin (porcine)  5,000 Units Subcutaneous Q8H LYNETTE Doroteo Reis MD      insulin lispro  1-6 Units Subcutaneous TID AC Rema Albert PA-C      losartan  100 mg Oral Daily Doroteo Reis MD      nicotine  1 patch Transdermal Daily Doroteo Reis MD      ondansetron  4 mg Intravenous Q6H PRN Doroteo Reis MD      oxyCODONE-acetaminophen  2 tablet Oral Q6H PRN Doroteo Reis MD      sodium chloride  75 mL/hr Intravenous Continuous Doroteo Reis MD 75 mL/hr (03/23/24 0901)    vancomycin  1,000 mg Intravenous Q8H Rema Albert PA-C          Today, Patient Was Seen By: Rema Albert PA-C    **Please Note: This note may have been constructed using a voice recognition system.**

## 2024-03-23 NOTE — CASE MANAGEMENT
Case Management Assessment & Discharge Planning Note    Patient name Nate Todd  Location /-01 MRN [id number]  : 1970 Date 3/23/2024       Current Admission Date: 3/22/2024  Current Admission Diagnosis:Perirectal abscess   Patient Active Problem List    Diagnosis Date Noted    Perirectal abscess 2024    Abnormal findings on diagnostic imaging of liver 2024    Stage 1 chronic kidney disease 2024    PAD (peripheral artery disease) (HCC) 2022    Iliac artery stenosis, right (HCC) 2018    Hyperlipidemia 08/10/2018    S/P hernia repair 08/10/2018    DM type 2 causing vascular disease (HCC) 08/10/2018    Essential hypertension 2017    Hemangioma of liver 2017    Obstructive sleep apnea syndrome 2017    Current smoker 2015    Abdominal aortic aneurysm (AAA) (HCC) 2015      LOS (days): 1  Geometric Mean LOS (GMLOS) (days):   Days to GMLOS:     OBJECTIVE:    Risk of Unplanned Readmission Score: 10.05         Current admission status: Inpatient  Referral Reason: VNA, Other    Preferred Pharmacy:   [id number] - [place], [place] - [address]  [address]  [facility] [address]  Phone: [phone] Fax: [phone]    Primary Care Provider: SHAWNEE Duncan    Primary Insurance: [employer] [facility]  Secondary Insurance:     CM met with patient at the bedside,baseline information  was obtained. CM discussed the role of CM in helping the patient develop a discharge plan and assist the patient in carry out their plan.      ASSESSMENT:  Active Health Care Proxies    There are no active Health Care Proxies on file.       Advance Directives  Does patient have a Health Care POA?: No  Was patient offered paperwork?: Yes (declined)  Does patient have Advance Directives?: No  Advance Directives: Living will  Was patient offered paperwork?: Yes  Primary Contact: PeytonCorina   [phone]          Readmission Root Cause  30 Day Readmission: Yes  Who directed you to return to the hospital?: Self  Did you understand whom to contact if you had questions or problems?: Yes  Did you get your prescriptions before you left the hospital?: No  Reason:: Not preferred pharmacy  Were you able to get your prescriptions filled when you left the hospital?: Yes  Did you take your medications as prescribed?: Yes  Were you able to get to your follow-up appointments?: No  Reason:: Readmitted prior to appointment  During previous admission, was a post-acute recommendation made?: Yes  What post-acute resources were offered?: [facility]  Patient was readmitted due to: concern with drainage of wound-- here 3/14/2024 - 3/15/2024 for perirectal abscess, went home with [facility].  Action Plan: Referral placed for [facility]  to continue for wound management    Patient Information  Admitted from:: Home  Mental Status: Alert  During Assessment patient was accompanied by: Not accompanied during assessment  Assessment information provided by:: Patient  Primary Caregiver: Self  Support Systems: Spouse/significant other  County of Residence: [facility]  What city do you live in?: [place]  Home entry access options. Select all that apply.: Stairs  Number of steps to enter home.:  (12)  Do the steps have railings?: Yes  Type of Current Residence: 2 [employer] home  Upon entering residence, is there a bedroom on the main floor (no further steps)?: Yes  Upon entering residence, is there a bathroom on the main floor (no further steps)?: Yes  Living Arrangements: Lives w/ Spouse/significant other, Other (Comment) (1 child)  Is patient a ?: No    Activities of Daily Living Prior to Admission  Functional Status: Independent  Completes ADLs independently?: Yes  Ambulates independently?: Yes  Does patient use assisted devices?: Yes  Assisted Devices (DME) used: BiPAP  DME Company Name (respiratory supplies): [employer]  Does patient currently  own DME?: Yes  What DME does the patient currently own?: BiP[employer]  Does patient have a history of Outpatient Therapy (PT/OT)?: No  Does the patient have a history of Short-Term Rehab?: No  Does patient have a history of HHC?: Yes  Does patient currently have HHC?: Yes    Current Home Health Care  Type of Current Home Care Services: Nurse visit  Current Home Health Agency:: Other (please enter name in comment) ([facility])  Current Home Health Follow-Up Provider:: PCP    Patient Information Continued  Income Source: Employed  Does patient have prescription coverage?: Yes  Does patient receive dialysis treatments?: No  Does patient have a history of substance abuse?: No  Does patient have a history of Mental Health Diagnosis?: No         Means of Transportation  Means of Transport to Appts:: Drives Self      Social Determinants of Health (SDOH)      Flowsheet Row Most Recent Value   Housing Stability    In the last 12 months, was there a time when you were not able to pay the mortgage or rent on time? N   In the last 12 months, how many places have you lived? 1   In the last 12 months, was there a time when you did not have a steady place to sleep or slept in a shelter (including now)? N   Transportation Needs    In the past 12 months, has lack of transportation kept you from medical appointments or from getting medications? no   In the past 12 months, has lack of transportation kept you from meetings, work, or from getting things needed for daily living? No   Food Insecurity    Within the past 12 months, you worried that your food would run out before you got the money to buy more. Never true   Within the past 12 months, the food you bought just didn't last and you didn't have money to get more. Never true   Utilities    In the past 12 months has the electric, gas, oil, or water company threatened to shut off services in your home? No            DISCHARGE DETAILS:    Discharge planning discussed with:: pt  Freedom of Choice:  Yes  Comments - Freedom of Choice: Per patient he is active with home care and would like this to be resumed upon dc_ HHC is [facility]  CM contacted family/caregiver?: No- see comments                  Requested Home Health Care         Is the patient interested in HHC at discharge?: Yes  Home Health Discipline requested:: Nursing  Home Health Agency Name:: [facility]  HHA External Referral Reason (only applicable if external HHA name selected): Patient has established relationship with provider  Home Health Follow-Up Provider:: PCP  Home Health Services Needed:: Wound/Ostomy Care, Post-Op Care and Assessment  Homebound Criteria Met:: Requires the Assistance of Another Person for Safe Ambulation or to Leave the Home  Supporting Clincal Findings:: Limited Endurance    DME Referral Provided  Referral made for DME?: No    Other Referral/Resources/Interventions Provided:  Interventions: HHC  Referral Comments: AIDIN referral was placed for [facility]         Treatment Team Recommendation: Home with Home Health Care  Discharge Destination Plan:: Home with Home Health Care  Transport at Discharge : Family               CM will follow up with BARBARA with [facility] for Wound Care Management.

## 2024-03-23 NOTE — PROGRESS NOTES
Progress Note - General Surgery   Nate Todd 53 y.o. male MRN: [id number]  Unit/Bed#: -01 Encounter: [id number]    Assessment:  52 yo M hx of recent I&D of perirectal abscess 3/14 d/c'd on augmentin, here with increased and foul smelling drainage from wound  AVSS, WBC 11 (11)    Notified that packing came out by nursing staff. Evaluated wound at bedside. Abd with some brown foul smelling drainage. Open wound with some left sided perineal/scrotal induration however no erythema appreciated. Wound probed for approx 4 cm deep in a narrow tunnel, after which significant amount of foul smelling brown pus drained out. Wound was irrigated with 20 cc NSS. And repacked with iodoform packing. 4x4 and abd placed overtop. No evidence of crepitus or other signs of necrotizing soft tissue infection. Wound edges and visualized base appeared clean and healthy    Plan:  - cont daily packing changes  - cont IV abx  - prn pain, antiemetic regimen  - appreciate SLIM recs  - cont to trend WBC  - tight BS control  - continue monitoring for signs of worsening infection    Subjective/Objective     Subjective: feels well. Tolerating diet. Denies fevers, chills, nausea.    Objective:     Blood pressure 123/76, pulse 82, temperature 97.5 °F (36.4 °C), temperature source Temporal, resp. rate 17, height 6' (1.829 m), weight 96.2 kg (212 lb), SpO2 94%.,Body mass index is 28.75 kg/m².      Intake/Output Summary (Last 24 hours) at 3/23/2024 1532  Last data filed at 3/23/2024 1226  Gross per 24 hour   Intake 800 ml   Output 2450 ml   Net -1650 ml       Invasive Devices       Peripheral Intravenous Line  Duration             Peripheral IV 03/22/24 Right Antecubital 1 day                    Physical Exam  Vitals and nursing note reviewed.   Constitutional:       General: He is not in acute distress.     Appearance: Normal appearance. He is normal weight. He is not ill-appearing, toxic-appearing or diaphoretic.   HENT:      Head:  Normocephalic and atraumatic.   Eyes:      Extraocular Movements: Extraocular movements intact.   Cardiovascular:      Rate and Rhythm: Normal rate.   Pulmonary:      Effort: Pulmonary effort is normal. No respiratory distress.   Skin:     General: Skin is warm.      Capillary Refill: Capillary refill takes less than 2 seconds.   Neurological:      General: No focal deficit present.      Mental Status: He is alert and oriented to person, place, and time.   Psychiatric:         Mood and Affect: Mood normal.         Behavior: Behavior normal.        See assessment for wound exam.      Scheduled Meds:  Current Facility-Administered Medications   Medication Dose Route Frequency Provider Last Rate    amLODIPine  10 mg Oral Daily Doroteo Reis MD      atorvastatin  10 mg Oral Daily Doroteo Reis MD      cefepime  2,000 mg Intravenous Q12H Doroteo Reis MD 2,000 mg (03/23/24 1406)    docusate sodium  100 mg Oral BID Doroteo Reis MD      Finerenone  1 tablet Oral Daily Doroteo Reis MD      heparin (porcine)  5,000 Units Subcutaneous Q8H LYNETTE Doroteo Reis MD      insulin lispro  1-6 Units Subcutaneous TID AC Rema Albert PA-C      losartan  100 mg Oral Daily Doroteo Reis MD      nicotine  1 patch Transdermal Daily Doroteo Reis MD      ondansetron  4 mg Intravenous Q6H PRN Doroteo Reis MD      oxyCODONE-acetaminophen  2 tablet Oral Q6H PRN Doroteo Reis MD      sodium chloride  75 mL/hr Intravenous Continuous Doroteo Reis MD 75 mL/hr (03/23/24 0901)    vancomycin  1,000 mg Intravenous Q8H Rema Albert PA-C       Continuous Infusions:sodium chloride, 75 mL/hr, Last Rate: 75 mL/hr (03/23/24 0901)      PRN Meds:.  ondansetron    oxyCODONE-acetaminophen      Lab, Imaging and other studies:I have personally reviewed pertinent lab results.  , CBC:   Lab Results   Component Value Date    WBC 11.20 (H) 03/23/2024    HGB 12.4 03/23/2024    HCT 37.5  "03/23/2024    MCV 90 03/23/2024     (H) 03/23/2024    RBC 4.19 03/23/2024    MCH 29.6 03/23/2024    MCHC 33.1 03/23/2024    RDW 12.5 03/23/2024    MPV 8.3 (L) 03/23/2024   , CMP:   Lab Results   Component Value Date    SODIUM 137 03/23/2024    K 3.9 03/23/2024     03/23/2024    CO2 25 03/23/2024    BUN 15 03/23/2024    CREATININE 0.70 03/23/2024    CALCIUM 8.3 (L) 03/23/2024    EGFR 106 03/23/2024   , Coagulation: No results found for: \"PT\", \"INR\", \"APTT\", Urinalysis: No results found for: \"COLORU\", \"CLARITYU\", \"SPECGRAV\", \"PHUR\", \"LEUKOCYTESUR\", \"NITRITE\", \"PROTEINUA\", \"GLUCOSEU\", \"KETONESU\", \"BILIRUBINUR\", \"BLOODU\"  VTE Pharmacologic Prophylaxis: Heparin  VTE Mechanical Prophylaxis: sequential compression device      Trice Nelson PA-C  3/23/2024 3:32 PM    "

## 2024-03-23 NOTE — ASSESSMENT & PLAN NOTE
Readmission with worsening perirectal abscess over the past week with foul smell and drainage  Had bedside I&D by surgery team on 3/14 during prior hospitalization  Continue cefepime and vancomycin  Monitor vital signs and fever curve  Pain management  Rest per primary

## 2024-03-24 LAB
ANION GAP SERPL CALCULATED.3IONS-SCNC: 7 MMOL/L (ref 4–13)
BUN SERPL-MCNC: 16 MG/DL (ref 5–25)
CALCIUM SERPL-MCNC: 8.7 MG/DL (ref 8.4–10.2)
CHLORIDE SERPL-SCNC: 104 MMOL/L (ref 96–108)
CO2 SERPL-SCNC: 26 MMOL/L (ref 21–32)
CREAT SERPL-MCNC: 0.65 MG/DL (ref 0.6–1.3)
ERYTHROCYTE [DISTWIDTH] IN BLOOD BY AUTOMATED COUNT: 12.2 % (ref 11.6–15.1)
GFR SERPL CREATININE-BSD FRML MDRD: 109 ML/MIN/1.73SQ M
GLUCOSE SERPL-MCNC: 150 MG/DL (ref 65–140)
GLUCOSE SERPL-MCNC: 177 MG/DL (ref 65–140)
GLUCOSE SERPL-MCNC: 177 MG/DL (ref 65–140)
GLUCOSE SERPL-MCNC: 190 MG/DL (ref 65–140)
GLUCOSE SERPL-MCNC: 201 MG/DL (ref 65–140)
HCT VFR BLD AUTO: 40.4 % (ref 36.5–49.3)
HGB BLD-MCNC: 13.7 G/DL (ref 12–17)
MCH RBC QN AUTO: 29.7 PG (ref 26.8–34.3)
MCHC RBC AUTO-ENTMCNC: 33.9 G/DL (ref 31.4–37.4)
MCV RBC AUTO: 88 FL (ref 82–98)
PLATELET # BLD AUTO: 405 THOUSANDS/UL (ref 149–390)
PMV BLD AUTO: 8.2 FL (ref 8.9–12.7)
POTASSIUM SERPL-SCNC: 4 MMOL/L (ref 3.5–5.3)
RBC # BLD AUTO: 4.61 MILLION/UL (ref 3.88–5.62)
SODIUM SERPL-SCNC: 137 MMOL/L (ref 135–147)
VANCOMYCIN SERPL-MCNC: 23.8 UG/ML (ref 10–20)
WBC # BLD AUTO: 8.76 THOUSAND/UL (ref 4.31–10.16)

## 2024-03-24 PROCEDURE — 99024 POSTOP FOLLOW-UP VISIT: CPT

## 2024-03-24 PROCEDURE — 99232 SBSQ HOSP IP/OBS MODERATE 35: CPT

## 2024-03-24 PROCEDURE — 85027 COMPLETE CBC AUTOMATED: CPT

## 2024-03-24 PROCEDURE — 82948 REAGENT STRIP/BLOOD GLUCOSE: CPT

## 2024-03-24 PROCEDURE — 80202 ASSAY OF VANCOMYCIN: CPT

## 2024-03-24 PROCEDURE — 80048 BASIC METABOLIC PNL TOTAL CA: CPT

## 2024-03-24 RX ADMIN — CEFEPIME HYDROCHLORIDE 2000 MG: 2 INJECTION, SOLUTION INTRAVENOUS at 14:11

## 2024-03-24 RX ADMIN — HEPARIN SODIUM 5000 UNITS: 5000 INJECTION, SOLUTION INTRAVENOUS; SUBCUTANEOUS at 05:09

## 2024-03-24 RX ADMIN — INSULIN LISPRO 1 UNITS: 100 INJECTION, SOLUTION INTRAVENOUS; SUBCUTANEOUS at 16:47

## 2024-03-24 RX ADMIN — ATORVASTATIN CALCIUM 10 MG: 10 TABLET, FILM COATED ORAL at 09:28

## 2024-03-24 RX ADMIN — VANCOMYCIN HYDROCHLORIDE 1000 MG: 1 INJECTION, SOLUTION INTRAVENOUS at 01:40

## 2024-03-24 RX ADMIN — SODIUM CHLORIDE 75 ML/HR: 0.9 INJECTION, SOLUTION INTRAVENOUS at 16:43

## 2024-03-24 RX ADMIN — VANCOMYCIN HYDROCHLORIDE 1000 MG: 1 INJECTION, SOLUTION INTRAVENOUS at 16:45

## 2024-03-24 RX ADMIN — FINERENONE 1 TABLET: 10 TABLET, FILM COATED ORAL at 09:29

## 2024-03-24 RX ADMIN — HEPARIN SODIUM 5000 UNITS: 5000 INJECTION, SOLUTION INTRAVENOUS; SUBCUTANEOUS at 21:18

## 2024-03-24 RX ADMIN — DOCUSATE SODIUM 100 MG: 100 CAPSULE, LIQUID FILLED ORAL at 09:28

## 2024-03-24 RX ADMIN — LOSARTAN POTASSIUM 100 MG: 50 TABLET, FILM COATED ORAL at 09:28

## 2024-03-24 RX ADMIN — VANCOMYCIN HYDROCHLORIDE 1000 MG: 1 INJECTION, SOLUTION INTRAVENOUS at 09:26

## 2024-03-24 RX ADMIN — CEFEPIME HYDROCHLORIDE 2000 MG: 2 INJECTION, SOLUTION INTRAVENOUS at 01:07

## 2024-03-24 RX ADMIN — INSULIN LISPRO 1 UNITS: 100 INJECTION, SOLUTION INTRAVENOUS; SUBCUTANEOUS at 07:51

## 2024-03-24 RX ADMIN — AMLODIPINE BESYLATE 10 MG: 10 TABLET ORAL at 09:28

## 2024-03-24 RX ADMIN — INSULIN LISPRO 2 UNITS: 100 INJECTION, SOLUTION INTRAVENOUS; SUBCUTANEOUS at 11:52

## 2024-03-24 RX ADMIN — HEPARIN SODIUM 5000 UNITS: 5000 INJECTION, SOLUTION INTRAVENOUS; SUBCUTANEOUS at 14:11

## 2024-03-24 NOTE — PLAN OF CARE

## 2024-03-24 NOTE — PROGRESS NOTES
Progress Note - General Surgery   Nate Todd 53 y.o. male MRN: [id number]  Unit/Bed#: -01 Encounter: [id number]    Assessment:  54 yo M hx of recent I&D of perirectal abscess 3/14 d/c'd on augmentin, here with increased and foul smelling drainage from wound    - Afebrile, VSS on room air  - WBC 8 (11, 11)  - Hgb 13 (12, 15)  - BMP WNL   - Glucose 201 (177, 175)    Plan:  - cont daily packing changes - iodoform packing covered with 4x4s and ABD, sallie-panties to hold in place  - cont IV abx, on Cefepime and Vanco, likely conversion to orals tomorrow  - Monitor labs  - Pain control PRN  - Appreciate medicine recs, tight BS control   - Pending wound appearance tomorrow and labs, possible d/c in next 24-48 hours    Subjective:   Patient states he is doing fine today.  He remains with some discomfort at perirectal abscess site but states pain is minimal.  Outside dressing was changed out by nursing staff earlier today but packing remains in place.  Patient denies fevers or chills.  Happy to hear his white blood count has normalized.    Objective:   Blood pressure 128/69, pulse 81, temperature 97.7 °F (36.5 °C), resp. rate 18, height 6' (1.829 m), weight 96.2 kg (212 lb), SpO2 95%.,Body mass index is 28.75 kg/m².    Intake/Output Summary (Last 24 hours) at 3/24/2024 1315  Last data filed at 3/24/2024 1154  Gross per 24 hour   Intake 2095 ml   Output 3675 ml   Net -1580 ml     Invasive Devices       Peripheral Intravenous Line  Duration             Peripheral IV 03/22/24 Right Antecubital 2 days                  Physical Exam:   General: no acute distress, pt appears well and comfortable, resting in bed on left side  Skin: warm and dry to touch, ABD and 4x4s removed from wound with obvious purulent, brown drainage from perirectal abscess site. Packing removed with active flow of purulent drainage noted. Probed wound and milked from edges attempting to release any pockets or remaining drainage. Purulent drainage  did stop and wound base appears healthy, pink. Packing was replaced into aspect that runs a bit deeper toward inferior side, area was covered with 4x4s and ABD secured by sallie-panties  Pulmonary: normal effort  Musculoskeletal: no LE edema present  Neuro: alert and oriented     Lab, Imaging and other studies:I have personally reviewed pertinent lab results.    CBC:   Lab Results   Component Value Date    WBC 8.76 03/24/2024    HGB 13.7 03/24/2024    HCT 40.4 03/24/2024    MCV 88 03/24/2024     (H) 03/24/2024    RBC 4.61 03/24/2024    MCH 29.7 03/24/2024    MCHC 33.9 03/24/2024    RDW 12.2 03/24/2024    MPV 8.2 (L) 03/24/2024   CMP:   Lab Results   Component Value Date    SODIUM 137 03/24/2024    K 4.0 03/24/2024     03/24/2024    CO2 26 03/24/2024    BUN 16 03/24/2024    CREATININE 0.65 03/24/2024    CALCIUM 8.7 03/24/2024    EGFR 109 03/24/2024     VTE Pharmacologic Prophylaxis: Heparin  VTE Mechanical Prophylaxis: sequential compression device    Katrina Elizabeth Billig, PA-C  3/24/2024

## 2024-03-24 NOTE — PROGRESS NOTES
Patient:    MRN:  [id number]    [place] Request ID:  [id number]    Level of care reserved:  Home Health Agency    Partner Reserved:  [facility], [place] [id number] [phone]    Clinical needs requested:  wound care & dressings, skilled nursing    Geography searched:  [id number]    Start of Service:    Request sent:  8:59am EDT on 3/23/2024 by Indira Dennison    Partner reserved:  3:35pm EDT on 3/24/2024 by Indira Dennison    Choice list shared:  3:35pm EDT on 3/24/2024 by Indira Dennison

## 2024-03-24 NOTE — UTILIZATION REVIEW
Initial Clinical Review    Admission: Date/Time/Statement:   Admission Orders (From admission, onward)       Ordered        03/22/24 1309  INPATIENT ADMISSION  Once                          Orders Placed This Encounter   Procedures    INPATIENT ADMISSION     Standing Status:   Standing     Number of Occurrences:   1     Order Specific Question:   Level of Care     Answer:   Med Surg [16]     Order Specific Question:   Estimated length of stay     Answer:   More than 2 Midnights     Order Specific Question:   Certification     Answer:   I certify that inpatient services are medically necessary for this patient for a duration of greater than two midnights. See H&P and MD Progress Notes for additional information about the patient's course of treatment.     ED Arrival Information       Expected   -    Arrival   3/22/2024 11:57    Acuity   Urgent              Means of arrival   Walk-In    Escorted by   Spouse    Service   Surgery-General    Admission type   Emergency              Arrival complaint   cyst drainage             Chief Complaint   Patient presents with    Abscess     Recently admitted for the same.  Continues to drain, foul smell. Dark drainage        Initial Presentation: 53 y.o. male hx of DM, 6 days out from I & D of perirectal abscess in [facility] ER presenting with ongoing and escalating pain, foul smelling drainage and sensation of residual lump adjacent to draining wound from I & D. Admitted IP status w/ plan for IV abx , wound care ,monitor for progression to necrotizing infection .     3/22 IM Consult   Perirectal abscess , DM , HTN . Cont IV abx , start SSI and monitor sugars . Restart home BP meds.     Date: 3/23   Day 2: Wound was probed this morning by the PA getting out significant amount of brown fluid. Cont IV abx , pain control and daily packing changes. Suspect the patient will be admitted through the weekend for IV antibiotics. No concern now for Theo's gangrene or any type of NSTI. Continue  current wound care.     3/23 IM Note   Cont cefepime and vanco , pain mngt . CKD CR 0.8-1 baseline . HTN cont losartan and amlodipine . Perirectal dressing CDI     ED Triage Vitals   Temperature Pulse Respirations Blood Pressure SpO2   03/22/24 1206 03/22/24 1206 03/22/24 1206 03/22/24 1206 03/22/24 1206   97.7 °F (36.5 °C) (!) 111 20 145/65 99 %      Temp Source Heart Rate Source Patient Position - Orthostatic VS BP Location FiO2 (%)   03/22/24 1206 03/22/24 1206 03/22/24 1206 03/22/24 2219 --   Temporal Monitor Lying Left arm       Pain Score       03/22/24 1206       4          Wt Readings from Last 1 Encounters:   03/22/24 96.2 kg (212 lb)     Additional Vital Signs:   03/23/24 2100 97.3 °F (36.3 °C) Abnormal  81 18 149/82 102 95 % -- Lying   03/23/24 1500 97.5 °F (36.4 °C) 82 17 123/76 86 94 % None (Room air) Lying   03/23/24 0903 -- -- -- -- -- 95 % None (Room air) --   03/23/24 06:30:10 97.5 °F (36.4 °C) 81 16 119/69 86 95 % -- --   03/23/24 0030 -- -- -- -- -- -- None (Room air) --   03/22/24 2219 97.7 °F (36.5 °C) 88 17 120/74 89 91 % -- --   03/22/24 16:16:22 -- 84 -- 132/74 93 96 % -- --   03/22/24 13:59:15 97.5 °F (36.4 °C) 86 18 127/78 94 95 % -- --   03/22/24 13:43:18 97.5 °F (36.4 °C) 84 18 141/81 101 94 % -- --   03/22/24 1341 -- -- -- -- -- -- None (Room air) --   03/22/24 1300 -- 94 18 168/86 -- 99 % None (Room      Pertinent Labs/Diagnostic Test Results:   No orders to display         Results from last 7 days   Lab Units 03/23/24  0453 03/22/24  1219   WBC Thousand/uL 11.20* 11.79*   HEMOGLOBIN g/dL 12.4 15.0   HEMATOCRIT % 37.5 44.0   PLATELETS Thousands/uL 393* 440*   BANDS PCT %  --  1         Results from last 7 days   Lab Units 03/23/24  0453 03/22/24  1219   SODIUM mmol/L 137 134*   POTASSIUM mmol/L 3.9 3.7   CHLORIDE mmol/L 106 99   CO2 mmol/L 25 28   ANION GAP mmol/L 6 7   BUN mg/dL 15 17   CREATININE mg/dL 0.70 0.88   EGFR ml/min/1.73sq m 106 96   CALCIUM mg/dL 8.3* 8.9     Results from  last 7 days   Lab Units 03/22/24  1219   AST U/L 10*   ALT U/L 30   ALK PHOS U/L 109*   TOTAL PROTEIN g/dL 7.8   ALBUMIN g/dL 3.6   TOTAL BILIRUBIN mg/dL 0.34     Results from last 7 days   Lab Units 03/23/24  2121 03/23/24  1555 03/23/24  1058 03/23/24  0744 03/22/24 2124 03/22/24  1558   POC GLUCOSE mg/dl 175* 143* 158* 169* 179* 196*     Results from last 7 days   Lab Units 03/23/24  0453 03/22/24  1219   GLUCOSE RANDOM mg/dL 150* 248*         Beta- Hydroxybutyrate   Date Value Ref Range Status   03/22/2024 <0.05 0.02 - 0.27 mmol/L Final            Results from last 7 days   Lab Units 03/22/24  1219   LACTIC ACID mmol/L 1.2       Results from last 7 days   Lab Units 03/24/24  0506   VANCOMYCIN RM ug/mL 23.8*       ED Treatment:   Medication Administration from 03/22/2024 1155 to 03/22/2024 1336         Date/Time Order Dose Route Action     03/22/2024 1219 EDT sodium chloride 0.9 % bolus 1,000 mL 1,000 mL Intravenous New Bag     03/22/2024 1223 EDT ondansetron (ZOFRAN) injection 4 mg 4 mg Intravenous Given     03/22/2024 1223 EDT morphine injection 4 mg 4 mg Intravenous Given     03/22/2024 1323 EDT cefepime (MAXIPIME) IVPB (premix in dextrose) 2,000 mg 50 mL 2,000 mg Intravenous New Bag          Past Medical History:   Diagnosis Date    Chronic kidney disease     Diabetes mellitus (HCC)     High cholesterol     Hypertension      Present on Admission:   Perirectal abscess   Essential hypertension   Stage 1 chronic kidney disease   DM type 2 causing vascular disease (HCC)      Admitting Diagnosis: Perirectal abscess [K61.1]  Abscess [L02.91]  DM type 2 causing vascular disease (HCC) [E11.59]  Stage 1 chronic kidney disease [N18.1]  Age/Sex: 53 y.o. male  Admission Orders:  Scheduled Medications:  amLODIPine, 10 mg, Oral, Daily  atorvastatin, 10 mg, Oral, Daily  cefepime, 2,000 mg, Intravenous, Q12H  docusate sodium, 100 mg, Oral, BID  Finerenone, 1 tablet, Oral, Daily  heparin (porcine), 5,000 Units, Subcutaneous,  Q8H LYNETTE  insulin lispro, 1-6 Units, Subcutaneous, TID AC  losartan, 100 mg, Oral, Daily  nicotine, 1 patch, Transdermal, Daily  vancomycin, 1,000 mg, Intravenous, Q8H      Continuous IV Infusions:  sodium chloride, 75 mL/hr, Intravenous, Continuous      PRN Meds:  ondansetron, 4 mg, Intravenous, Q6H PRN  oxyCODONE-acetaminophen, 2 tablet, Oral, Q6H PRN        IP CONSULT TO INTERNAL MEDICINE  IP CONSULT TO PHARMACY    Network Utilization Review Department  ATTENTION: Please call with any questions or concerns to [phone] and carefully listen to the prompts so that you are directed to the right person. All voicemails are confidential.   For Discharge needs, contact Care Management DC Support Team at [phone] opt. [phone]  Send all requests for admission clinical reviews, approved or denied determinations and any other requests to dedicated fax number below belonging to the [place] where the patient is receiving treatment. List of dedicated fax numbers for the Facilities:  FACILITY NAME UR FAX NUMBER   ADMISSION DENIALS (Administrative/Medical Necessity) [phone]   DISCHARGE SUPPORT TEAM (NETWORK) [phone]   PARENT CHILD HEALTH (Maternity/NICU/Pediatrics) [phone]   [facility] [phone]   [facility] [phone]   [facility] [phone]   [facility] [phone]   [facility] [phone]   [facility] [phone]   [facility] [phone]   [facility] [phone]   [facility] [phone]   [facility] [phone]   [facility] [phone]   [facility] [phone]

## 2024-03-24 NOTE — ASSESSMENT & PLAN NOTE
Lab Results   Component Value Date    HGBA1C 8.4 (H) 11/13/2023       Recent Labs     03/23/24  1058 03/23/24  1555 03/23/24  2121 03/24/24  0730   POCGLU 158* 143* 175* 177*         Blood Sugar Average: Last 72 hrs:  (P) 171Hold home diabetic medications-can be resumed on discharge and stop insulin  Insulin sliding scale and hypoglycemia protocol while inpatient

## 2024-03-24 NOTE — CASE MANAGEMENT
Case Management Discharge Planning Note    Patient name Nate Todd  Location /-01 MRN [id number]  : 1970 Date 3/24/2024       Current Admission Date: 3/22/2024  Current Admission Diagnosis:Perirectal abscess   Patient Active Problem List    Diagnosis Date Noted    Perirectal abscess 2024    Abnormal findings on diagnostic imaging of liver 2024    Stage 1 chronic kidney disease 2024    PAD (peripheral artery disease) (HCC) 2022    Iliac artery stenosis, right (HCC) 2018    Hyperlipidemia 08/10/2018    S/P hernia repair 08/10/2018    DM type 2 causing vascular disease (HCC) 08/10/2018    Essential hypertension 2017    Hemangioma of liver 2017    Obstructive sleep apnea syndrome 2017    Current smoker 2015    Abdominal aortic aneurysm (AAA) (HCC) 2015      LOS (days): 2  Geometric Mean LOS (GMLOS) (days):   Days to GMLOS:     OBJECTIVE:  Risk of Unplanned Readmission Score: 8.41         Current admission status: Inpatient   Preferred Pharmacy:   [id number] - [place], [place] - [address]  [address]  [facility] [address]  Phone: [phone] Fax: [phone]    Primary Care Provider: SHAWNEE Duncan    Primary Insurance: [employer] DOM[employer]NGUEZ  Secondary Insurance:     DISCHARGE DETAILS:     [facility] has accepted for resumption of services, upon dc.  CM updated [facility] that patient will be here another 24- 48 hours.  DC plan is [facility].

## 2024-03-24 NOTE — PROGRESS NOTES
Nate Todd is a 53 y.o. male who is currently ordered Vancomycin IV with management by the Pharmacy Consult service.  Relevant clinical data and objective / subjective history reviewed.  Vancomycin Assessment:  Indication and Goal AUC/Trough: Soft tissue (goal -600, trough >10)  Clinical Status: stable  Micro:   No results  Renal Function:  SCr: 0.65 mg/dL  CrCl: 154.4 mL/min  Renal replacement: Not on dialysis  Days of Therapy: 3  Current Dose: 1000 mg IV every 8 hours  Vancomycin Plan:  New Dosing: continue regimen  Estimated AUC: 511 mcg*hr/mL  Estimated Trough: 15.3 mcg/mL  Next Level: 3/29/24 @ 0600  Renal Function Monitoring: Daily BMP and UOP  Pharmacy will continue to follow closely for s/sx of nephrotoxicity, infusion reactions and appropriateness of therapy.  BMP and CBC will be ordered per protocol. We will continue to follow the patient’s culture results and clinical progress daily.    Luis Love, Pharmacist

## 2024-03-24 NOTE — ASSESSMENT & PLAN NOTE
Lab Results   Component Value Date    EGFR 109 03/24/2024    EGFR 106 03/23/2024    EGFR 96 03/22/2024    CREATININE 0.65 03/24/2024    CREATININE 0.70 03/23/2024    CREATININE 0.88 03/22/2024   Baseline 0.8-1.  Currently at baseline  Avoid hypotension and nephrotoxic agents

## 2024-03-24 NOTE — PLAN OF CARE
Problem: Potential for Falls  Goal: Patient will remain free of falls  Description: INTERVENTIONS:  - Educate patient/family on patient safety including physical limitations  - Instruct patient to call for assistance with activity   - Consult OT/PT to assist with strengthening/mobility   - Keep Call bell within reach  - Keep bed low and locked with side rails adjusted as appropriate  - Keep care items and personal belongings within reach  - Initiate and maintain comfort rounds  - Make Fall Risk Sign visible to staff  - Apply yellow socks and bracelet for high fall risk patients  - Consider moving patient to room near nurses station  Outcome: Progressing     Problem: PAIN - ADULT  Goal: Verbalizes/displays adequate comfort level or baseline comfort level  Description: Interventions:  - Encourage patient to monitor pain and request assistance  - Assess pain using appropriate pain scale  - Administer analgesics based on type and severity of pain and evaluate response  - Implement non-pharmacological measures as appropriate and evaluate response  - Consider cultural and social influences on pain and pain management  - Notify physician/advanced practitioner if interventions unsuccessful or patient reports new pain  Outcome: Progressing     Problem: INFECTION - ADULT  Goal: Absence or prevention of progression during hospitalization  Description: INTERVENTIONS:  - Assess and monitor for signs and symptoms of infection  - Monitor lab/diagnostic results  - Monitor all insertion sites, i.e. indwelling lines, tubes, and drains  - Monitor endotracheal if appropriate and nasal secretions for changes in amount and color  - [place] appropriate cooling/warming therapies per order  - Administer medications as ordered  - Instruct and encourage patient and family to use good hand hygiene technique  - Identify and instruct in appropriate isolation precautions for identified infection/condition  Outcome: Progressing     Problem:  SAFETY ADULT  Goal: Patient will remain free of falls  Description: INTERVENTIONS:  - Educate patient/family on patient safety including physical limitations  - Instruct patient to call for assistance with activity   - Consult OT/PT to assist with strengthening/mobility   - Keep Call bell within reach  - Keep bed low and locked with side rails adjusted as appropriate  - Keep care items and personal belongings within reach  - Initiate and maintain comfort rounds  - Make Fall Risk Sign visible to staff  - Apply yellow socks and bracelet for high fall risk patients  - Consider moving patient to room near nurses station  Outcome: Progressing  Goal: Maintain or return to baseline ADL function  Description: INTERVENTIONS:  -  Assess patient's ability to carry out ADLs; assess patient's baseline for ADL function and identify physical deficits which impact ability to perform ADLs (bathing, care of mouth/teeth, toileting, grooming, dressing, etc.)  - Assess/evaluate cause of self-care deficits   - Assess range of motion  - Assess patient's mobility; develop plan if impaired  - Assess patient's need for assistive devices and provide as appropriate  - Encourage maximum independence but intervene and supervise when necessary  - Involve family in performance of ADLs  - Assess for home care needs following discharge   - Consider OT consult to assist with ADL evaluation and planning for discharge  - Provide patient education as appropriate  Outcome: Progressing     Problem: DISCHARGE PLANNING  Goal: Discharge to home or other facility with appropriate resources  Description: INTERVENTIONS:  - Identify barriers to discharge w/patient and caregiver  - Arrange for needed discharge resources and transportation as appropriate  - Identify discharge learning needs (meds, wound care, etc.)  - Arrange for interpretive services to assist at discharge as needed  - Refer to Case Management Department for coordinating discharge planning if the  patient needs post-hospital services based on physician/advanced practitioner order or complex needs related to functional status, cognitive ability, or social support system  Outcome: Progressing  Goal: Incision(s), wounds(s) or drain site(s) healing without S/S of infection  Description: INTERVENTIONS  - Assess and document dressing, incision, wound bed, drain sites and surrounding tissue  - Provide patient and family education  - Perform skin care/dressing changes as ordered and PRN  Outcome: Progressing     Problem: MOBILITY - ADULT  Goal: Maintain or return to baseline ADL function  Description: INTERVENTIONS:  -  Assess patient's ability to carry out ADLs; assess patient's baseline for ADL function and identify physical deficits which impact ability to perform ADLs (bathing, care of mouth/teeth, toileting, grooming, dressing, etc.)  - Assess/evaluate cause of self-care deficits   - Assess range of motion  - Assess patient's mobility; develop plan if impaired  - Assess patient's need for assistive devices and provide as appropriate  - Encourage maximum independence but intervene and supervise when necessary  - Involve family in performance of ADLs  - Assess for home care needs following discharge   - Consider OT consult to assist with ADL evaluation and planning for discharge  - Provide patient education as appropriate  Outcome: Progressing  Goal: Maintains/Returns to pre admission functional level  Description: INTERVENTIONS:  - Perform AM-PAC 6 Click Basic Mobility/ Daily Activity assessment daily.  - Set and communicate daily mobility goal to care team and patient/family/caregiver.   - Collaborate with rehabilitation services on mobility goals if consulted  - Perform Range of Motion 2 times a day.  - Reposition patient every 2 hours.  - Dangle patient 2 times a day  - Stand patient 2 times a day  - Ambulate patient 2 times a day  - Out of bed to chair 2 times a day   - Out of bed for meals 2 times a day  -  Out of bed for toileting  - Record patient progress and toleration of activity level   Outcome: Progressing

## 2024-03-24 NOTE — PROGRESS NOTES
[facility]  [facility]  Name: Nate Todd I  MRN: [id number]  Unit/Bed#: MS 318Hayley I Date of Admission: 3/22/2024   Date of Service: 3/24/2024 I Hospital Day: 2    Assessment/Plan   * Perirectal abscess  Assessment & Plan  Readmission with worsening perirectal abscess over the past week with foul smell and drainage  Had bedside I&D by surgery team on 3/14 during prior hospitalization  Continue cefepime and vancomycin  Monitor vital signs and fever curve  Pain management  Rest per primary    DM type 2 causing vascular disease (HCC)  Assessment & Plan  Lab Results   Component Value Date    HGBA1C 8.4 (H) 11/13/2023       Recent Labs     03/23/24  1058 03/23/24  1555 03/23/24  2121 03/24/24  0730   POCGLU 158* 143* 175* 177*         Blood Sugar Average: Last 72 hrs:  (P) 171Hold home diabetic medications-can be resumed on discharge and stop insulin  Insulin sliding scale and hypoglycemia protocol while inpatient    Stage 1 chronic kidney disease  Assessment & Plan  Lab Results   Component Value Date    EGFR 109 03/24/2024    EGFR 106 03/23/2024    EGFR 96 03/22/2024    CREATININE 0.65 03/24/2024    CREATININE 0.70 03/23/2024    CREATININE 0.88 03/22/2024   Baseline 0.8-1.  Currently at baseline  Avoid hypotension and nephrotoxic agents    Essential hypertension  Assessment & Plan  Continue losartan and amlodipine             VTE Pharmacologic Prophylaxis:   Moderate Risk (Score 3-4) - Pharmacological DVT Prophylaxis Ordered: heparin.    Mobility:   Basic Mobility Inpatient Raw Score: 24  JH-HLM Goal: 8: Walk 250 feet or more  JH-HLM Achieved: 8: Walk 250 feet ot more  JH-HLM Goal achieved. Continue to encourage appropriate mobility.    Patient Centered Rounds: I performed bedside rounds with nursing staff today.   Discussions with Specialists or Other Care Team Provider: nursing     Education and Discussions with Family / Patient:  per primary.     Total Time Spent on Date of  Encounter in care of patient:  mins. This time was spent on one or more of the following: performing physical exam; counseling and coordination of care; obtaining or reviewing history; documenting in the medical record; reviewing/ordering tests, medications or procedures; communicating with other healthcare professionals and discussing with patient's family/caregivers.    Current Length of Stay: 2 day(s)  Current Patient Status: Inpatient   Certification Statement: The patient will continue to require additional inpatient hospital stay due to requiring iv antibiotics for perirectal abscess  Discharge Plan: Anticipate discharge in 24-48 hrs to home.    Code Status: Level 1 - Full Code    Subjective:   Patient states that he is feeling fine today. Denies chest pain and shortness of breath. Does not offer any complaints at this time.    Objective:     Vitals:   Temp (24hrs), Av.5 °F (36.4 °C), Min:97.3 °F (36.3 °C), Max:97.7 °F (36.5 °C)    Temp:  [97.3 °F (36.3 °C)-97.7 °F (36.5 °C)] 97.7 °F (36.5 °C)  HR:  [81-82] 81  Resp:  [17-18] 18  BP: (123-149)/(69-82) 128/69  SpO2:  [94 %-95 %] 95 %  Body mass index is 28.75 kg/m².     Input and Output Summary (last 24 hours):     Intake/Output Summary (Last 24 hours) at 3/24/2024 1012  Last data filed at 3/24/2024 0900  Gross per 24 hour   Intake 2395 ml   Output 3700 ml   Net -1305 ml       Physical Exam:   Physical Exam  Vitals reviewed.   Constitutional:       General: He is not in acute distress.     Appearance: Normal appearance. He is not ill-appearing.   HENT:      Head: Normocephalic and atraumatic.      Nose: Nose normal.      Mouth/Throat:      Mouth: Mucous membranes are moist.      Pharynx: Oropharynx is clear.   Eyes:      Extraocular Movements: Extraocular movements intact.      Conjunctiva/sclera: Conjunctivae normal.   Cardiovascular:      Rate and Rhythm: Normal rate and regular rhythm.      Pulses: Normal pulses.      Heart sounds: Normal heart sounds.  No murmur heard.  Pulmonary:      Effort: Pulmonary effort is normal. No respiratory distress.      Breath sounds: Normal breath sounds. No wheezing.   Abdominal:      General: Abdomen is flat. Bowel sounds are normal. There is no distension.      Palpations: Abdomen is soft.      Tenderness: There is no abdominal tenderness. There is no guarding.   Musculoskeletal:         General: Normal range of motion.      Cervical back: Normal range of motion.      Right lower leg: No edema.      Left lower leg: No edema.   Skin:     General: Skin is warm.      Comments: Rectal dressing cdi   Neurological:      General: No focal deficit present.      Mental Status: He is alert and oriented to person, place, and time. Mental status is at baseline.      Motor: No weakness.   Psychiatric:         Mood and Affect: Mood normal.         Behavior: Behavior normal.         Thought Content: Thought content normal.         Judgment: Judgment normal.          Additional Data:     Labs:  Results from last 7 days   Lab Units 03/24/24  0506 03/23/24  0453 03/22/24  1219   WBC Thousand/uL 8.76   < > 11.79*   HEMOGLOBIN g/dL 13.7   < > 15.0   HEMATOCRIT % 40.4   < > 44.0   PLATELETS Thousands/uL 405*   < > 440*   BANDS PCT %  --   --  1   LYMPHO PCT %  --   --  22   MONO PCT %  --   --  4   EOS PCT %  --   --  0    < > = values in this interval not displayed.     Results from last 7 days   Lab Units 03/24/24  0506 03/23/24  0453 03/22/24  1219   SODIUM mmol/L 137   < > 134*   POTASSIUM mmol/L 4.0   < > 3.7   CHLORIDE mmol/L 104   < > 99   CO2 mmol/L 26   < > 28   BUN mg/dL 16   < > 17   CREATININE mg/dL 0.65   < > 0.88   ANION GAP mmol/L 7   < > 7   CALCIUM mg/dL 8.7   < > 8.9   ALBUMIN g/dL  --   --  3.6   TOTAL BILIRUBIN mg/dL  --   --  0.34   ALK PHOS U/L  --   --  109*   ALT U/L  --   --  30   AST U/L  --   --  10*   GLUCOSE RANDOM mg/dL 150*   < > 248*    < > = values in this interval not displayed.         Results from last 7 days   Lab  Units 03/24/24  0730 03/23/24 2121 03/23/24  1555 03/23/24  1058 03/23/24  0744 03/22/24 2124 03/22/24  1558   POC GLUCOSE mg/dl 177* 175* 143* 158* 169* 179* 196*         Results from last 7 days   Lab Units 03/22/24  1219   LACTIC ACID mmol/L 1.2       Lines/Drains:  Invasive Devices       Peripheral Intravenous Line  Duration             Peripheral IV 03/22/24 Right Antecubital 1 day                          Imaging: No pertinent imaging reviewed.    Recent Cultures (last 7 days):         Last 24 Hours Medication List:   Current Facility-Administered Medications   Medication Dose Route Frequency Provider Last Rate    amLODIPine  10 mg Oral Daily Doroteo Reis MD      atorvastatin  10 mg Oral Daily Doroteo Reis MD      cefepime  2,000 mg Intravenous Q12H Doroteo Reis MD Stopped (03/24/24 0137)    docusate sodium  100 mg Oral BID Doroteo Reis MD      Finerenone  1 tablet Oral Daily Doroteo Reis MD      heparin (porcine)  5,000 Units Subcutaneous Q8H LYNETTE Doroteo Reis MD      insulin lispro  1-6 Units Subcutaneous TID AC Rema Albetr PA-C      losartan  100 mg Oral Daily Doroteo Reis MD      nicotine  1 patch Transdermal Daily Doroteo Reis MD      ondansetron  4 mg Intravenous Q6H PRN Dortoeo Reis MD      oxyCODONE-acetaminophen  2 tablet Oral Q6H PRN Doroteo Reis MD      sodium chloride  75 mL/hr Intravenous Continuous Doroteo Reis MD 75 mL/hr (03/23/24 2122)    vancomycin  1,000 mg Intravenous Q8H Rema Albert PA-C 1,000 mg (03/24/24 0926)        Today, Patient Was Seen By: Rema Albert PA-C    **Please Note: This note may have been constructed using a voice recognition system.**

## 2024-03-24 NOTE — UTILIZATION REVIEW
NOTIFICATION OF INPATIENT ADMISSION   AUTHORIZATION REQUEST   SERVICING FACILITY:   [place] [address]  Tax ID: [id number]  NPI: [id number] ATTENDING PROVIDER:  Attending Name and NPI#: Doroteo Reis Md [[id number]]  Address: [address] [place] [address]  Phone: [phone]   ADMISSION INFORMATION:  Place of Service: Inpatient [facility]  Place of Service Code: 21  Inpatient Admission Date/Time: 3/22/24  1:09 PM  Discharge Date/Time: No discharge date for patient encounter.  Admitting Diagnosis Code/Description:  Perirectal abscess [K61.1]  Abscess [L02.91]  DM type 2 causing vascular disease (HCC) [E11.59]  Stage 1 chronic kidney disease [N18.1]     UTILIZATION REVIEW CONTACT:  Ramandeep Pardo, Utilization   Network Utilization Review Department  Phone: [phone]  Fax [phone]  Email: [email or web address]  Contact for approvals/pending authorizations, clinical reviews, and discharge.     PHYSICIAN ADVISORY SERVICES:  Medical Necessity Denial & Qtwe-cr-Hsps Review  Phone: [phone]  Fax: [phone]  Email: [email or web address]     DISCHARGE SUPPORT TEAM:  For Patients Discharge Needs & Updates  Phone: [phone] opt. 2 Fax: [phone]  Email: [email or web address]

## 2024-03-24 NOTE — PLAN OF CARE
Problem: Potential for Falls  Goal: Patient will remain free of falls  Description: INTERVENTIONS:  - Educate patient/family on patient safety including physical limitations  - Instruct patient to call for assistance with activity   - Consult OT/PT to assist with strengthening/mobility   - Keep Call bell within reach  - Keep bed low and locked with side rails adjusted as appropriate  - Keep care items and personal belongings within reach  - Initiate and maintain comfort rounds  - Make Fall Risk Sign visible to staff  - Offer Toileting every 2 Hours, in advance of need  - Initiate/Maintain bed/chair alarm  - Obtain necessary fall risk management equipment  - Apply yellow socks and bracelet for high fall risk patients  - Consider moving patient to room near nurses station  Outcome: Progressing     Problem: PAIN - ADULT  Goal: Verbalizes/displays adequate comfort level or baseline comfort level  Description: Interventions:  - Encourage patient to monitor pain and request assistance  - Assess pain using appropriate pain scale  - Administer analgesics based on type and severity of pain and evaluate response  - Implement non-pharmacological measures as appropriate and evaluate response  - Consider cultural and social influences on pain and pain management  - Notify physician/advanced practitioner if interventions unsuccessful or patient reports new pain  Outcome: Progressing     Problem: INFECTION - ADULT  Goal: Absence or prevention of progression during hospitalization  Description: INTERVENTIONS:  - Assess and monitor for signs and symptoms of infection  - Monitor lab/diagnostic results  - Monitor all insertion sites, i.e. indwelling lines, tubes, and drains  - Monitor endotracheal if appropriate and nasal secretions for changes in amount and color  - Brethren appropriate cooling/warming therapies per order  - Administer medications as ordered  - Instruct and encourage patient and family to use good hand hygiene  technique  - Identify and instruct in appropriate isolation precautions for identified infection/condition  Outcome: Progressing     Problem: SAFETY ADULT  Goal: Patient will remain free of falls  Description: INTERVENTIONS:  - Educate patient/family on patient safety including physical limitations  - Instruct patient to call for assistance with activity   - Consult OT/PT to assist with strengthening/mobility   - Keep Call bell within reach  - Keep bed low and locked with side rails adjusted as appropriate  - Keep care items and personal belongings within reach  - Initiate and maintain comfort rounds  - Make Fall Risk Sign visible to staff  - Offer Toileting every 2 Hours, in advance of need  - Initiate/Maintain bed/chair alarm  - Obtain necessary fall risk management equipment  - Apply yellow socks and bracelet for high fall risk patients  - Consider moving patient to room near nurses station  Outcome: Progressing  Goal: Maintain or return to baseline ADL function  Description: INTERVENTIONS:  -  Assess patient's ability to carry out ADLs; assess patient's baseline for ADL function and identify physical deficits which impact ability to perform ADLs (bathing, care of mouth/teeth, toileting, grooming, dressing, etc.)  - Assess/evaluate cause of self-care deficits   - Assess range of motion  - Assess patient's mobility; develop plan if impaired  - Assess patient's need for assistive devices and provide as appropriate  - Encourage maximum independence but intervene and supervise when necessary  - Involve family in performance of ADLs  - Assess for home care needs following discharge   - Consider OT consult to assist with ADL evaluation and planning for discharge  - Provide patient education as appropriate  Outcome: Progressing  Goal: Maintains/Returns to pre admission functional level  Description: INTERVENTIONS:  - Perform AM-PAC 6 Click Basic Mobility/ Daily Activity assessment daily.  - Set and communicate daily  mobility goal to care team and patient/family/caregiver.   - Collaborate with rehabilitation services on mobility goals if consulted  - Perform Range of Motion 3 times a day.  - Reposition patient every 2 hours.  - Dangle patient 3 times a day  - Stand patient 3 times a day  - Ambulate patient 3 times a day  - Out of bed to chair 3 times a day   - Out of bed for meals 3 times a day  - Out of bed for toileting  - Record patient progress and toleration of activity level   Outcome: Progressing     Problem: DISCHARGE PLANNING  Goal: Discharge to home or other facility with appropriate resources  Description: INTERVENTIONS:  - Identify barriers to discharge w/patient and caregiver  - Arrange for needed discharge resources and transportation as appropriate  - Identify discharge learning needs (meds, wound care, etc.)  - Arrange for interpretive services to assist at discharge as needed  - Refer to Case Management Department for coordinating discharge planning if the patient needs post-hospital services based on physician/advanced practitioner order or complex needs related to functional status, cognitive ability, or social support system  Outcome: Progressing     Problem: Knowledge Deficit  Goal: Patient/family/caregiver demonstrates understanding of disease process, treatment plan, medications, and discharge instructions  Description: Complete learning assessment and assess knowledge base.  Interventions:  - Provide teaching at level of understanding  - Provide teaching via preferred learning methods  Outcome: Progressing     Problem: SKIN/TISSUE INTEGRITY - ADULT  Goal: Skin Integrity remains intact(Skin Breakdown Prevention)  Description: Assess:  -Perform Chase assessment every shift  -Clean and moisturize skin every shift   -Inspect skin when repositioning, toileting, and assisting with ADLS  -Assess under medical devices  -Assess extremities for adequate circulation and sensation     Bed Management:  -Have minimal  linens on bed & keep smooth, unwrinkled  -Change linens as needed when moist or perspiring  -Avoid sitting or lying in one position for more than 4 hours while in bed    Toileting:  -Offer bedside commode  -Assess for incontinence  -Use incontinent care products after each incontinent episode    Activity:  -Mobilize patient 3 times a day  -Encourage activity and walks on unit  -Encourage or provide ROM exercises   -Use appropriate equipment to lift or move patient in bed    Skin Care:  -Avoid use of baby powder, tape, friction and shearing, hot water or constrictive clothing  -Relieve pressure over bony prominences  -Do not massage red bony areas    Next Steps:  -Teach patient strategies to minimize risks   -Consider consults to  interdisciplinary teams  Outcome: Progressing  Goal: Incision(s), wounds(s) or drain site(s) healing without S/S of infection  Description: INTERVENTIONS  - Assess and document dressing, incision, wound bed, drain sites and surrounding tissue  - Provide patient and family education  - Perform skin care/dressing changes every shift   Outcome: Progressing

## 2024-03-25 VITALS
WEIGHT: 212 LBS | SYSTOLIC BLOOD PRESSURE: 123 MMHG | HEART RATE: 78 BPM | HEIGHT: 72 IN | TEMPERATURE: 97.3 F | BODY MASS INDEX: 28.71 KG/M2 | DIASTOLIC BLOOD PRESSURE: 73 MMHG | OXYGEN SATURATION: 95 % | RESPIRATION RATE: 12 BRPM

## 2024-03-25 LAB
ERYTHROCYTE [DISTWIDTH] IN BLOOD BY AUTOMATED COUNT: 12.2 % (ref 11.6–15.1)
GLUCOSE SERPL-MCNC: 146 MG/DL (ref 65–140)
GLUCOSE SERPL-MCNC: 171 MG/DL (ref 65–140)
HCT VFR BLD AUTO: 39.5 % (ref 36.5–49.3)
HGB BLD-MCNC: 13.5 G/DL (ref 12–17)
MCH RBC QN AUTO: 30.1 PG (ref 26.8–34.3)
MCHC RBC AUTO-ENTMCNC: 34.2 G/DL (ref 31.4–37.4)
MCV RBC AUTO: 88 FL (ref 82–98)
PLATELET # BLD AUTO: 386 THOUSANDS/UL (ref 149–390)
PMV BLD AUTO: 8.1 FL (ref 8.9–12.7)
RBC # BLD AUTO: 4.49 MILLION/UL (ref 3.88–5.62)
WBC # BLD AUTO: 9.63 THOUSAND/UL (ref 4.31–10.16)

## 2024-03-25 PROCEDURE — 85027 COMPLETE CBC AUTOMATED: CPT

## 2024-03-25 PROCEDURE — 82948 REAGENT STRIP/BLOOD GLUCOSE: CPT

## 2024-03-25 PROCEDURE — 99232 SBSQ HOSP IP/OBS MODERATE 35: CPT

## 2024-03-25 RX ORDER — DOCUSATE SODIUM 100 MG/1
100 CAPSULE, LIQUID FILLED ORAL 2 TIMES DAILY PRN
Start: 2024-03-25

## 2024-03-25 RX ORDER — AMOXICILLIN AND CLAVULANATE POTASSIUM 875; 125 MG/1; MG/1
1 TABLET, FILM COATED ORAL EVERY 12 HOURS SCHEDULED
Qty: 20 TABLET | Refills: 0 | Status: SHIPPED | OUTPATIENT
Start: 2024-03-25 | End: 2024-04-04

## 2024-03-25 RX ADMIN — FINERENONE 1 TABLET: 10 TABLET, FILM COATED ORAL at 07:59

## 2024-03-25 RX ADMIN — HEPARIN SODIUM 5000 UNITS: 5000 INJECTION, SOLUTION INTRAVENOUS; SUBCUTANEOUS at 05:30

## 2024-03-25 RX ADMIN — VANCOMYCIN HYDROCHLORIDE 1000 MG: 1 INJECTION, SOLUTION INTRAVENOUS at 07:57

## 2024-03-25 RX ADMIN — SODIUM CHLORIDE 75 ML/HR: 0.9 INJECTION, SOLUTION INTRAVENOUS at 08:00

## 2024-03-25 RX ADMIN — ATORVASTATIN CALCIUM 10 MG: 10 TABLET, FILM COATED ORAL at 07:57

## 2024-03-25 RX ADMIN — CEFEPIME HYDROCHLORIDE 2000 MG: 2 INJECTION, SOLUTION INTRAVENOUS at 01:49

## 2024-03-25 RX ADMIN — AMLODIPINE BESYLATE 10 MG: 10 TABLET ORAL at 07:57

## 2024-03-25 RX ADMIN — INSULIN LISPRO 1 UNITS: 100 INJECTION, SOLUTION INTRAVENOUS; SUBCUTANEOUS at 12:15

## 2024-03-25 RX ADMIN — LOSARTAN POTASSIUM 100 MG: 50 TABLET, FILM COATED ORAL at 07:57

## 2024-03-25 RX ADMIN — VANCOMYCIN HYDROCHLORIDE 1000 MG: 1 INJECTION, SOLUTION INTRAVENOUS at 00:30

## 2024-03-25 NOTE — DISCHARGE SUMMARY
"  Discharge Summary - Nate Todd 54 y.o. male MRN: [id number]    Unit/Bed#: -01 Encounter: [id number]    Admission Date: 3/22/2024   Discharge Date: 03/25/24    Admitting Diagnosis:   Perirectal abscess [K61.1]  Abscess [L02.91]  DM type 2 causing vascular disease (HCC) [E11.59]  Stage 1 chronic kidney disease [N18.1]    Discharge Diagnoses: Principal Problem:    Perirectal abscess  Active Problems:    Essential hypertension    Stage 1 chronic kidney disease    DM type 2 causing vascular disease (HCC)      Consultations: SLIM    Imaging: none    Procedures Performed: none    HPI: (obtained from admission H&P completed by Dr Reis on 3/22/24 )  Nate Todd is a 53 y.o. male with significant vascular disease s/p AAA repair, NIDDM, Chronic Renal Insuffiencywho apparently declined admission following drainage presents with pain, swelling and ongoing foul drainage from perineal wound.  Relates I & D in ER, discharge from ER on Augmentin, VNA and family has been packing the wound.  Pt notes \"lump\" adjacent to the wound from indurated tissue.  Returns to ER, significant induration and purulence noted, no fluctuance or crepitance noted.        Hospital Course: Nate Todd is a 54 y.o. male who presented 3/22/2024 with increasing perirectal pain, induration and foul-smelling drainage from his perirectal abscess which had been I&D 6 days earlier.  The perirectal abscess had been I&D on 3/14/2024.  He was then discharged on 3/15/2024 with a prescription for 7 days of Augmentin.  He was scheduled for follow-up in the outpatient general surgery office on 3/22 but due to worsening symptoms presented to the ED instead.  He was admitted to the general surgery service and noted to have soft tissue infection of the perineum but no crepitus, fluctuance or suggestion of an undrained abscess or Theo's gangrene.  He received local wound care with daily packing and was started on broad-spectrum antibiotics.  On " HD 2, the wound was probed approx 4cm deep getting out a significant amount of brown fluid.  On HD 3, his WBC count had normalized. Today, HD 4 his wound edges are without erythema and induration is minimal. He feels well and the drainage from his wound has lessened.    Patient was discharged on hospital day 4. On the day of discharge, the patient was voiding spontaneously, ambulating at baseline, and pain was well controlled. The patient was sent home with a prescription for further HHC and Augmentin. He was also given the names and numbers of the providers as well as instructions for follow up in 1 week in the outpt surgery office.    Condition at Discharge: good     Discharge instructions/Information to patient and family:   See after visit summary for information provided to patient and family.      Provisions for Follow-Up Care:  See after-visit summary for information related to follow-up care and any pertinent home health orders.      Disposition: Home    Planned Readmission: No    Discharge Statement   I spent 30 minutes discharging the patient. This time was spent on the day of discharge. I had direct contact with the patient on the day of discharge. Additional documentation is required if more than 30 minutes were spent on discharge.     Discharge Medications:  See after visit summary for reconciled discharge medications provided to patient and family.

## 2024-03-25 NOTE — CASE MANAGEMENT
Case Management Discharge Planning Note    Patient name Nate Todd  Location /-01 MRN [id number]  : 1970 Date 3/25/2024       Current Admission Date: 3/22/2024  Current Admission Diagnosis:Perirectal abscess   Patient Active Problem List    Diagnosis Date Noted    Perirectal abscess 2024    Abnormal findings on diagnostic imaging of liver 2024    Stage 1 chronic kidney disease 2024    PAD (peripheral artery disease) (HCC) 2022    Iliac artery stenosis, right (HCC) 2018    Hyperlipidemia 08/10/2018    S/P hernia repair 08/10/2018    DM type 2 causing vascular disease (HCC) 08/10/2018    Essential hypertension 2017    Hemangioma of liver 2017    Obstructive sleep apnea syndrome 2017    Current smoker 2015    Abdominal aortic aneurysm (AAA) (HCC) 2015      LOS (days): 3  Geometric Mean LOS (GMLOS) (days):   Days to GMLOS:     OBJECTIVE:  Risk of Unplanned Readmission Score: 8.76         Current admission status: Inpatient   Preferred Pharmacy:   [id number] - [place], [place] - [address]  [address]  [facility] [address]  Phone: [phone] Fax: [phone]    Primary Care Provider: SHAWNEE Duncan    [place] [employer] [facility] [place]:     DISCHARGE DETAILS:        CM communicated with [facility] in [place] regarding discharge. CM faxed AVS and attached script in [place]. CM faxed signed copies of scripts for PT and OT as well.    CM to follow for patient's care and discharge needs.

## 2024-03-25 NOTE — PROGRESS NOTES
[place] [facility]  Progress Note  Name: Nate Todd I  MRN: [id number]  Unit/Bed#: -Adrienne I Date of Admission: 3/22/2024   Date of Service: 3/25/2024 I Hospital Day: 3    Assessment/Plan   * Perirectal abscess  Assessment & Plan  Readmission with worsening perirectal abscess over the past week with foul smell and drainage  Had bedside I&D by surgery team on 3/14 during prior hospitalization  Continue cefepime and vancomycin  Monitor vital signs and fever curve  Pain management  Continue abx on discharge.   Rest per primary    SLIM will sign off, please don't hesitate to reach out for any further questions or concerns.     DM type 2 causing vascular disease (HCC)  Assessment & Plan  Lab Results   Component Value Date    HGBA1C 8.4 (H) 11/13/2023       Recent Labs     03/24/24  1051 03/24/24  1601 03/24/24  2131 03/25/24  0726   POCGLU 201* 177* 190* 146*         Blood Sugar Average: Last 72 hrs:  (P) [id number]  Hold home diabetic medications  Insulin sliding scale and hypoglycemia protocol while inpatient  Can resume home meds on discharge and stop insulin    Stage 1 chronic kidney disease  Assessment & Plan  Lab Results   Component Value Date    EGFR 109 03/24/2024    EGFR 106 03/23/2024    EGFR 96 03/22/2024    CREATININE 0.65 03/24/2024    CREATININE 0.70 03/23/2024    CREATININE 0.88 03/22/2024   Baseline 0.8-1.  Currently at baseline  Avoid hypotension and nephrotoxic agents    Essential hypertension  Assessment & Plan  Continue losartan and amlodipine  Can continue on discharge.            VTE Pharmacologic Prophylaxis:   Moderate Risk (Score 3-4) - Pharmacological DVT Prophylaxis Ordered: heparin.    Mobility:   Basic Mobility Inpatient Raw Score: 24  JH-HLM Goal: 8: Walk 250 feet or more  JH-HLM Achieved: 8: Walk 250 feet ot more  JH-HLM Goal achieved. Continue to encourage appropriate mobility.    Patient Centered Rounds: I performed bedside rounds with nursing staff  today.   Discussions with Specialists or Other Care Team Provider: nursing, case management, surgery    Education and Discussions with Family / Patient:  per primary.     Total Time Spent on Date of Encounter in care of patient:  mins. This time was spent on one or more of the following: performing physical exam; counseling and coordination of care; obtaining or reviewing history; documenting in the medical record; reviewing/ordering tests, medications or procedures; communicating with other healthcare professionals and discussing with patient's family/caregivers.    Current Length of Stay: 3 day(s)  Current Patient Status: Inpatient   Certification Statement: The patient will continue to require additional inpatient hospital stay due to IV abx for perirectal abscess  Discharge Plan:  possibly later today vs tomorrow per primary services recs    Code Status: Level 1 - Full Code    Subjective:   Patient seen and examined today. States that he is doing well. States that the pain is tolerable currently. He is hopeful that he is able to go home today. No chest pain or SOB, fever, chills, nausea, vomiting. Said he changed the pad under him because he was having some drainage from the abscess.     Objective:     Vitals:   Temp (24hrs), Av.6 °F (36.4 °C), Min:97.3 °F (36.3 °C), Max:98.1 °F (36.7 °C)    Temp:  [97.3 °F (36.3 °C)-98.1 °F (36.7 °C)] 97.3 °F (36.3 °C)  HR:  [72-78] 78  Resp:  [12-18] 12  BP: (123-139)/(73-79) 123/73  SpO2:  [95 %] 95 %  Body mass index is 28.75 kg/m².     Input and Output Summary (last 24 hours):     Intake/Output Summary (Last 24 hours) at 3/25/2024 1021  Last data filed at 3/25/2024 0820  Gross per 24 hour   Intake 3006.25 ml   Output 2725 ml   Net 281.25 ml       Physical Exam:   Physical Exam  Vitals reviewed.   Constitutional:       General: He is not in acute distress.     Appearance: He is not ill-appearing or toxic-appearing.   HENT:      Head: Normocephalic and atraumatic.       Mouth/Throat:      Mouth: Mucous membranes are moist.   Cardiovascular:      Rate and Rhythm: Normal rate and regular rhythm.      Heart sounds: No murmur heard.  Pulmonary:      Effort: No respiratory distress.      Breath sounds: No stridor. No wheezing, rhonchi or rales.   Abdominal:      General: Bowel sounds are normal. There is no distension.      Palpations: Abdomen is soft. There is no mass.      Tenderness: There is no abdominal tenderness.   Musculoskeletal:      Right lower leg: No edema.      Left lower leg: No edema.   Skin:     General: Skin is warm and dry.      Comments: Rectal dressing when pulled down shows some purulent brown drainage from the rectal area, but overall exterior of dressing is c/d/i   Neurological:      General: No focal deficit present.      Mental Status: He is alert and oriented to person, place, and time.   Psychiatric:         Mood and Affect: Mood normal.         Behavior: Behavior normal.          Additional Data:     Labs:  Results from last 7 days   Lab Units 03/25/24  0446 03/23/24  0453 03/22/24  1219   WBC Thousand/uL 9.63   < > 11.79*   HEMOGLOBIN g/dL 13.5   < > 15.0   HEMATOCRIT % 39.5   < > 44.0   PLATELETS Thousands/uL 386   < > 440*   BANDS PCT %  --   --  1   LYMPHO PCT %  --   --  22   MONO PCT %  --   --  4   EOS PCT %  --   --  0    < > = values in this interval not displayed.     Results from last 7 days   Lab Units 03/24/24  0506 03/23/24  0453 03/22/24  1219   SODIUM mmol/L 137   < > 134*   POTASSIUM mmol/L 4.0   < > 3.7   CHLORIDE mmol/L 104   < > 99   CO2 mmol/L 26   < > 28   BUN mg/dL 16   < > 17   CREATININE mg/dL 0.65   < > 0.88   ANION GAP mmol/L 7   < > 7   CALCIUM mg/dL 8.7   < > 8.9   ALBUMIN g/dL  --   --  3.6   TOTAL BILIRUBIN mg/dL  --   --  0.34   ALK PHOS U/L  --   --  109*   ALT U/L  --   --  30   AST U/L  --   --  10*   GLUCOSE RANDOM mg/dL 150*   < > 248*    < > = values in this interval not displayed.         Results from last 7 days   Lab  Units 03/25/24  0726 03/24/24  2131 03/24/24  1601 03/24/24  1051 03/24/24  0730 03/23/24  2121 03/23/24  1555 03/23/24  1058 03/23/24  0744 03/22/24 2124 03/22/24  1558   POC GLUCOSE mg/dl 146* 190* 177* 201* 177* 175* 143* 158* 169* 179* 196*         Results from last 7 days   Lab Units 03/22/24  1219   LACTIC ACID mmol/L 1.2       Lines/Drains:  Invasive Devices       Peripheral Intravenous Line  Duration             Peripheral IV 03/22/24 Right Antecubital 2 days                          Imaging: No pertinent imaging reviewed.    Recent Cultures (last 7 days):         Last 24 Hours Medication List:   Current Facility-Administered Medications   Medication Dose Route Frequency Provider Last Rate    amLODIPine  10 mg Oral Daily Doroteo Reis MD      atorvastatin  10 mg Oral Daily Doroteo Reis MD      cefepime  2,000 mg Intravenous Q12H Doroteo Reis MD 2,000 mg (03/25/24 0149)    docusate sodium  100 mg Oral BID Doroteo Reis MD      Finerenone  1 tablet Oral Daily Doroteo Reis MD      heparin (porcine)  5,000 Units Subcutaneous Q8H LYNETTE Doroteo Reis MD      insulin lispro  1-6 Units Subcutaneous TID AC Rema Albert PA-C      losartan  100 mg Oral Daily Doroteo Reis MD      nicotine  1 patch Transdermal Daily Doroteo Reis MD      ondansetron  4 mg Intravenous Q6H PRN Doroteo Reis MD      oxyCODONE-acetaminophen  2 tablet Oral Q6H PRN Doroteo Reis MD      sodium chloride  75 mL/hr Intravenous Continuous Doroteo Reis MD 75 mL/hr (03/25/24 0800)    vancomycin  1,000 mg Intravenous Q8H Rema Albert PA-C 1,000 mg (03/25/24 0757)        Today, Patient Was Seen By: Kimber Rosen PA-C    **Please Note: This note may have been constructed using a voice recognition system.**

## 2024-03-25 NOTE — PROGRESS NOTES
Progress Note - General Surgery   Nate Todd 54 y.o. male MRN: [id number]  Unit/Bed#: -01 Encounter: [id number]    Assessment:  54 yo M hx of recent I&D of perirectal abscess 3/14 d/c'd on augmentin, here with increased and foul smelling drainage from wound    - Afebrile, VSS on room air  - WBC 9 (8, 11, 11)  - Hgb 13 (12, 15)  - BMP yesterday WNL   - Glucose 150 (201, 177, 175)    Plan:    -Discharge planning for today: continue HHC, F/U in outpatient surgery office in 1 week. Prescription sent for 10 more days of Augmentin.  - cont daily packing changes - iodoform packing covered with 4x4s and ABD, sallie-panties to hold in place  - Appreciate medicine recs, tight BS control     Subjective:   Patient states he is doing fine today.  He remains with some discomfort at perirectal abscess site but states pain is minimal.  Denies F/C.    Objective:   Blood pressure 123/73, pulse 78, temperature (!) 97.3 °F (36.3 °C), resp. rate 12, height 6' (1.829 m), weight 96.2 kg (212 lb), SpO2 95%.,Body mass index is 28.75 kg/m².    Intake/Output Summary (Last 24 hours) at 3/25/2024 1240  Last data filed at 3/25/2024 0820  Gross per 24 hour   Intake 2806.25 ml   Output 1900 ml   Net 906.25 ml     Invasive Devices       None                 Physical Exam:   General: no acute distress, pt appears well and comfortable, resting in bed on left side  Skin: warm and dry to touch, ABD and packing removed from wound with scant purulent, brown drainage from perirectal abscess site.  Probed wound which extends superiorly toward his perineum approx 4-6cm. Wound base appears healthy, pink. Minimal Induration and no erythema of periwound tissues. Packing was replaced into aspect that runs a bit deeper, area was covered with 4x4s and ABD secured by tape.  Pulmonary: normal effort  Musculoskeletal: no LE edema present  Neuro: alert and oriented     Lab, Imaging and other studies:I have personally reviewed pertinent lab results.    CBC:  "  Lab Results   Component Value Date    WBC 9.63 03/25/2024    HGB 13.5 03/25/2024    HCT 39.5 03/25/2024    MCV 88 03/25/2024     03/25/2024    RBC 4.49 03/25/2024    MCH 30.1 03/25/2024    MCHC 34.2 03/25/2024    RDW 12.2 03/25/2024    MPV 8.1 (L) 03/25/2024   CMP:   No results found for: \"SODIUM\", \"K\", \"CL\", \"CO2\", \"ANIONGAP\", \"BUN\", \"CREATININE\", \"GLUCOSE\", \"CALCIUM\", \"AST\", \"ALT\", \"ALKPHOS\", \"PROT\", \"BILITOT\", \"EGFR\"    VTE Pharmacologic Prophylaxis: Heparin  VTE Mechanical Prophylaxis: sequential compression device    Palmira Dumont PA-C  3/25/2024  "

## 2024-03-25 NOTE — PROGRESS NOTES
Nate Todd is a 54 y.o. male who is currently ordered Vancomycin IV with management by the Pharmacy Consult service.  Relevant clinical data and objective / subjective history reviewed.  Vancomycin Assessment:  Indication and Goal AUC/Trough: Soft tissue (goal -600, trough >10)  Clinical Status: stable  Micro:     Renal Function:  SCr: 0.65 mg/dL on 3/24  CrCl: 152 mL/min  Renal replacement: Not on dialysis  Days of Therapy: 4  Current Dose: 1000 mg IV q 8 hrs  Vancomycin Plan:  New Dosing: Continue 1000 mg IV q 8 hrs  Estimated AUC: 512 mcg*hr/mL  Estimated Trough: 15.4 mcg/mL  Next Level: 3/29 at 0600  Renal Function Monitoring: Daily BMP and UOP  Pharmacy will continue to follow closely for s/sx of nephrotoxicity, infusion reactions and appropriateness of therapy.  BMP and CBC will be ordered per protocol. We will continue to follow the patient’s culture results and clinical progress daily.    Nando Brady, Pharmacist

## 2024-03-25 NOTE — ASSESSMENT & PLAN NOTE
Lab Results   Component Value Date    HGBA1C 8.4 (H) 11/13/2023       Recent Labs     03/24/24  1051 03/24/24  1601 03/24/24  2131 03/25/24  0726   POCGLU 201* 177* 190* 146*         Blood Sugar Average: Last 72 hrs:  (P) [id number]  Hold home diabetic medications  Insulin sliding scale and hypoglycemia protocol while inpatient  Can resume home meds on discharge and stop insulin

## 2024-03-25 NOTE — PLAN OF CARE
Problem: Potential for Falls  Goal: Patient will remain free of falls  Description: INTERVENTIONS:  - Educate patient/family on patient safety including physical limitations  - Instruct patient to call for assistance with activity   - Consult OT/PT to assist with strengthening/mobility   - Keep Call bell within reach  - Keep bed low and locked with side rails adjusted as appropriate  - Keep care items and personal belongings within reach  - Initiate and maintain comfort rounds  - Make Fall Risk Sign visible to staff  - Offer Toileting every . Hours, in advance of need  - Initiate/Maintain .alarm  - Obtain necessary fall risk management equipment: .  - Apply yellow socks and bracelet for high fall risk patients  - Consider moving patient to room near nurses station  Outcome: Progressing

## 2024-03-25 NOTE — ASSESSMENT & PLAN NOTE
Readmission with worsening perirectal abscess over the past week with foul smell and drainage  Had bedside I&D by surgery team on 3/14 during prior hospitalization  Continue cefepime and vancomycin  Monitor vital signs and fever curve  Pain management  Continue abx on discharge.   Rest per primary    SLIM will sign off, please don't hesitate to reach out for any further questions or concerns.

## 2024-03-25 NOTE — DISCHARGE INSTR - AVS FIRST PAGE
"Continue with daily packing changes of the sallie-rectal wound.     To exchange packing: Remove old packing. Gently wash outside of wound with soap and water, pat dry. Repack with 1/2 or 1\" plain packing. The packing should be gently placed with a long cotton-tipped applicator into the deepest part of the wound which at this point is approx 7 cm deep. Continue to fill the wound with packing but not tightly.  Cover with gauze or ABD and secure secure with tape or use your underwear to hold the dressings in place.  Do not use more than one piece of packing at a time. The wound will get shallower with time.     Keep the area clean and as protected from fecal contamination as possible.      "

## 2024-03-26 NOTE — UTILIZATION REVIEW
NOTIFICATION OF ADMISSION DISCHARGE   This is a Notification of Discharge from [facility]. Please be advised that this patient has been discharge from our facility. Below you will find the admission and discharge date and time including the patient’s disposition.   UTILIZATION REVIEW CONTACT:  Ramandeep Pardo  Utilization   Network Utilization Review Department  Phone: [phone] x carefully listen to the prompts. All voicemails are confidential.  Email: [email or web address]     ADMISSION INFORMATION  PRESENTATION DATE: 3/22/2024 12:00 PM  OBERVATION ADMISSION DATE:   INPATIENT ADMISSION DATE: 3/22/24  1:09 PM   DISCHARGE DATE: 3/25/2024  3:25 PM   DISPOSITION:Home with Home Health Care    Network Utilization Review Department  ATTENTION: Please call with any questions or concerns to [phone] and carefully listen to the prompts so that you are directed to the right person. All voicemails are confidential.   For Discharge needs, contact Care Management DC Support Team at [phone] opt. [phone]  Send all requests for admission clinical reviews, approved or denied determinations and any other requests to dedicated fax number below belonging to the campus where the patient is receiving treatment. List of dedicated fax numbers for the Facilities:  FACILITY NAME UR FAX NUMBER   ADMISSION DENIALS (Administrative/Medical Necessity) [phone]   DISCHARGE SUPPORT TEAM ([facility]) [phone]   PARENT CHILD HEALTH (Maternity/NICU/Pediatrics) [phone]   [facility] [phone]   [facility] [phone]   [facility] [phone]   [facility] [phone]   [facility] [phone]   [facility] [phone]   [facility] [phone]   [facility]  [place] [phone]   [facility] [phone]   Critical access hospital [phone]   [facility] [phone]   [facility] [phone]

## 2024-10-29 ENCOUNTER — RX ONLY (RX ONLY)
Age: 54
End: 2024-10-29

## 2024-10-29 ENCOUNTER — DOCTOR'S OFFICE (OUTPATIENT)
Dept: URBAN - NONMETROPOLITAN AREA CLINIC 1 | Facility: CLINIC | Age: 54
Setting detail: OPHTHALMOLOGY
End: 2024-10-29
Payer: COMMERCIAL

## 2024-10-29 ENCOUNTER — APPOINTMENT (OUTPATIENT)
Dept: URGENT CARE | Facility: CLINIC | Age: 54
End: 2024-10-29

## 2024-10-29 ENCOUNTER — OPTICAL OFFICE (OUTPATIENT)
Dept: URBAN - NONMETROPOLITAN AREA CLINIC 4 | Facility: CLINIC | Age: 54
Setting detail: OPHTHALMOLOGY
End: 2024-10-29
Payer: COMMERCIAL

## 2024-10-29 DIAGNOSIS — H52.4: ICD-10-CM

## 2024-10-29 DIAGNOSIS — E11.3293: ICD-10-CM

## 2024-10-29 DIAGNOSIS — H52.03: ICD-10-CM

## 2024-10-29 DIAGNOSIS — H25.13: ICD-10-CM

## 2024-10-29 DIAGNOSIS — H43.393: ICD-10-CM

## 2024-10-29 PROCEDURE — V2203 LENS SPHCYL BIFOCAL 4.00D/.1: HCPCS | Performed by: OPTOMETRIST

## 2024-10-29 PROCEDURE — V2203 LENS SPHCYL BIFOCAL 4.00D/.1: HCPCS | Mod: LT | Performed by: OPTOMETRIST

## 2024-10-29 PROCEDURE — 92015 DETERMINE REFRACTIVE STATE: CPT | Performed by: OPTOMETRIST

## 2024-10-29 PROCEDURE — V2020 VISION SVCS FRAMES PURCHASES: HCPCS | Performed by: OPTOMETRIST

## 2024-10-29 PROCEDURE — 92004 COMPRE OPH EXAM NEW PT 1/>: CPT | Performed by: OPTOMETRIST

## 2024-10-29 PROCEDURE — V2784 LENS POLYCARB OR EQUAL: HCPCS | Mod: LT | Performed by: OPTOMETRIST

## 2024-10-29 PROCEDURE — 92134 CPTRZ OPH DX IMG PST SGM RTA: CPT | Performed by: OPTOMETRIST

## 2024-10-29 PROCEDURE — V2784 LENS POLYCARB OR EQUAL: HCPCS | Performed by: OPTOMETRIST

## 2024-10-29 ASSESSMENT — REFRACTION_MANIFEST
OD_VA1: 20/20-2
OD_CYLINDER: -0.25
OS_SPHERE: +1.00
OD_VA2: 20/20-2
OS_ADD: +2.50
OS_CYLINDER: -0.50
OD_AXIS: 090
OS_AXIS: 090
OS_VA2: 20/20-2
OD_SPHERE: +1.00
OD_ADD: +2.50
OS_VA1: 20/20-2

## 2024-10-29 ASSESSMENT — REFRACTION_CURRENTRX
OD_AXIS: 91
OS_AXIS: 90
OS_OVR_VA: 20/
OD_SPHERE: +0.75
OD_ADD: +1.75
OS_VPRISM_DIRECTION: TRF
OS_CYLINDER: -0.50
OS_SPHERE: +0.50
OD_VPRISM_DIRECTION: TRF
OS_ADD: +1.75
OD_CYLINDER: -0.25
OD_OVR_VA: 20/

## 2024-10-29 ASSESSMENT — VISUAL ACUITY
OD_BCVA: 20/30+1
OS_BCVA: 20/25-2

## 2024-10-29 ASSESSMENT — CONFRONTATIONAL VISUAL FIELD TEST (CVF)
OD_FINDINGS: FULL
OS_FINDINGS: FULL

## 2024-10-29 ASSESSMENT — TONOMETRY
OS_IOP_MMHG: 16
OD_IOP_MMHG: 16

## 2024-10-29 ASSESSMENT — REFRACTION_AUTOREFRACTION
OD_CYLINDER: -0.50
OD_AXIS: 92
OS_SPHERE: +0.75
OD_SPHERE: +1.00
OS_AXIS: 76
OS_CYLINDER: -0.75

## 2024-10-30 ENCOUNTER — OPTICAL OFFICE (OUTPATIENT)
Dept: URBAN - NONMETROPOLITAN AREA CLINIC 4 | Facility: CLINIC | Age: 54
Setting detail: OPHTHALMOLOGY
End: 2024-10-30
Payer: COMMERCIAL

## 2024-10-30 DIAGNOSIS — H52.03: ICD-10-CM

## 2024-10-30 PROCEDURE — V2784 LENS POLYCARB OR EQUAL: HCPCS | Mod: LT | Performed by: OPTOMETRIST

## 2024-10-30 PROCEDURE — V2203 LENS SPHCYL BIFOCAL 4.00D/.1: HCPCS | Mod: LT | Performed by: OPTOMETRIST

## 2024-10-30 PROCEDURE — V2020 VISION SVCS FRAMES PURCHASES: HCPCS | Performed by: OPTOMETRIST

## 2024-10-30 PROCEDURE — V2784 LENS POLYCARB OR EQUAL: HCPCS | Performed by: OPTOMETRIST

## 2024-10-30 PROCEDURE — V2203 LENS SPHCYL BIFOCAL 4.00D/.1: HCPCS | Performed by: OPTOMETRIST

## 2024-11-20 ENCOUNTER — TELEPHONE (OUTPATIENT)
Dept: UROLOGY | Facility: CLINIC | Age: 54
End: 2024-11-20

## 2024-11-20 ENCOUNTER — TELEPHONE (OUTPATIENT)
Age: 54
End: 2024-11-20

## 2024-11-20 NOTE — TELEPHONE ENCOUNTER
New Patient    What is the reason for the patient’s appointment?:bladder wall thickening    What office location does the patient prefer?: [place]    Does patient have Imaging/Lab Results: labs and CT will be sent to office for review prior to appt    Labs completed with PCP will fax   Imaging completed with [facility] and pt will pancho images on disc to drop off at office

## 2024-12-11 RX ORDER — MELOXICAM 15 MG/1
15 TABLET ORAL DAILY
COMMUNITY

## 2024-12-11 RX ORDER — ASPIRIN 81 MG/1
81 TABLET ORAL DAILY
COMMUNITY

## 2024-12-11 RX ORDER — LIRAGLUTIDE 6 MG/ML
1.8 INJECTION SUBCUTANEOUS DAILY
COMMUNITY

## 2024-12-20 ENCOUNTER — TELEPHONE (OUTPATIENT)
Age: 54
End: 2024-12-20

## 2024-12-20 ENCOUNTER — OFFICE VISIT (OUTPATIENT)
Dept: UROLOGY | Facility: CLINIC | Age: 54
End: 2024-12-20
Payer: COMMERCIAL

## 2024-12-20 VITALS
HEART RATE: 56 BPM | WEIGHT: 200 LBS | SYSTOLIC BLOOD PRESSURE: 160 MMHG | DIASTOLIC BLOOD PRESSURE: 76 MMHG | BODY MASS INDEX: 28 KG/M2 | TEMPERATURE: 98 F | HEIGHT: 71 IN

## 2024-12-20 DIAGNOSIS — N40.1 BENIGN PROSTATIC HYPERPLASIA WITH INCOMPLETE BLADDER EMPTYING: Primary | ICD-10-CM

## 2024-12-20 DIAGNOSIS — R39.14 BENIGN PROSTATIC HYPERPLASIA WITH INCOMPLETE BLADDER EMPTYING: Primary | ICD-10-CM

## 2024-12-20 DIAGNOSIS — N52.01 ERECTILE DYSFUNCTION DUE TO ARTERIAL INSUFFICIENCY: ICD-10-CM

## 2024-12-20 LAB
SL AMB  POCT GLUCOSE, UA: ABNORMAL
SL AMB LEUKOCYTE ESTERASE,UA: ABNORMAL
SL AMB POCT BILIRUBIN,UA: ABNORMAL
SL AMB POCT BLOOD,UA: ABNORMAL
SL AMB POCT CLARITY,UA: CLEAR
SL AMB POCT COLOR,UA: YELLOW
SL AMB POCT KETONES,UA: ABNORMAL
SL AMB POCT NITRITE,UA: ABNORMAL
SL AMB POCT PH,UA: 7
SL AMB POCT SPECIFIC GRAVITY,UA: 1.01
SL AMB POCT URINE PROTEIN: ABNORMAL
SL AMB POCT UROBILINOGEN: 0.2

## 2024-12-20 PROCEDURE — 81003 URINALYSIS AUTO W/O SCOPE: CPT | Performed by: UROLOGY

## 2024-12-20 PROCEDURE — 99204 OFFICE O/P NEW MOD 45 MIN: CPT | Performed by: UROLOGY

## 2024-12-20 RX ORDER — FINERENONE 20 MG/1
20 TABLET, FILM COATED ORAL
COMMUNITY
Start: 2024-12-19

## 2024-12-20 RX ORDER — SILDENAFIL CITRATE 20 MG/1
TABLET ORAL
Qty: 90 TABLET | Refills: 3 | Status: SHIPPED | OUTPATIENT
Start: 2024-12-20

## 2024-12-20 NOTE — PROGRESS NOTES
UROLOGY PROGRESS NOTE         NAME: Nate Todd  AGE: 54 y.o. SEX: male  : 1970   MRN: [id number]    DATE: 2024  TIME: 1:32 PM    Assessment and Plan      Impression:   1. Benign prostatic hyperplasia with incomplete bladder emptying  2. Erectile dysfunction due to arterial insufficiency    Patient's better wall thickening is likely related to BPH.  He does have erectile dysfunction.  He has tried low-dose Cialis.  PSA normal year ago.  0.2   Plan: We will get him on some Viagra 20 mg tablets up to 5 tablets 1 hour prior to sex on empty stomach.  We talked about the remaining options.  Will see him back in a year with a PSA.      Chief Complaint     Chief Complaint   Patient presents with    New pt     History of Present Illness     HPI: Nate Todd is a 54 y.o. year old male who presents with history of a thickening of his bladder wall on a recent CAT scan.  He was sent here for that reason.  He has no urinary symptoms.  No nocturia no dysuria no history of UTIs no incontinence.  No hematuria.  Urinalysis is positive for glucosuria and mild proteinuria.  No history of stone disease or infections.  Flow is good.  He feels that he empties.  No hesitancy.  He does have erectile dysfunction he has tried low-dose Cialis without help.  He does have a good libido.  PSA was normal year ago 0.2              The following portions of the patient's history were reviewed and updated as appropriate: allergies, current medications, past family history, past medical history, past social history, past surgical history and problem list.  Past Medical History:   Diagnosis Date    Chronic kidney disease     Diabetes mellitus (HCC)     Diverticulitis     High cholesterol     Hypertension      Past Surgical History:   Procedure Laterality Date    ABDOMINAL AORTIC ANEURYSM REPAIR      ADENOIDECTOMY      CHOLECYSTECTOMY      COLON SIGMOID RESECTION      COLOSTOMY      HERNIA REPAIR      REVISION COLOSTOMY    "   TONSILLECTOMY       shoulder  Review of Systems     Const: Denies chills, fever and weight loss.  CV: Denies chest pain.  Resp: Denies SOB.  GI: Denies abdominal pain, nausea and vomiting.  : Denies symptoms other than stated above.  Musculo: Denies back pain.    Objective   /76   Pulse 56   Temp 98 °F (36.7 °C)   Ht 5' 11\" (1.803 m)   Wt 90.7 kg (200 lb)   BMI 27.89 kg/m²     Physical Exam  Const: Appears healthy and well developed. No signs of acute distress present.  Resp: Respirations are regular and unlabored.   CV: Rate is regular. Rhythm is regular.  Abdomen: Abdomen is soft, nontender, and nondistended. Kidneys are not palpable.  : Penis testicles epididymis normal.  His bilateral small hydroceles.  No hernias.  Prostate 1+ benign.  Psych: Patient's attitude is cooperative. Mood is normal. Affect is normal.    Current Medications     Current Outpatient Medications:     amLODIPine (NORVASC) 10 mg tablet, Take 1 tablet by mouth daily, Disp: , Rfl:     atorvastatin (Lipitor) 10 mg tablet, Take 10 mg by mouth daily at bedtime, Disp: , Rfl:     Canagliflozin (Invokana) 300 MG TABS, Take 300 mg by mouth daily, Disp: , Rfl:     Droplet Pen Needles 32G X 4 MM MISC, use 1 PEN NEEDLE to inject MEDICATION subcutaneously once daily, Disp: , Rfl:     glipiZIDE (GLUCOTROL XL) 5 mg 24 hr tablet, Take 5 mg by mouth daily after dinner (Patient taking differently: Take 5 mg by mouth 2 (two) times a day), Disp: , Rfl:     Kerendia 10 MG TABS, Take 1 tablet by mouth daily, Disp: , Rfl:     Kerendia 20 MG TABS, Take 20 mg by mouth daily in the early morning, Disp: , Rfl:     losartan (COZAAR) 100 MG tablet, Take 100 mg by mouth daily, Disp: , Rfl:     metFORMIN (GLUCOPHAGE) 1000 MG tablet, Take 1 tablet by mouth 2 (two) times a day with meals, Disp: , Rfl:     Mounjaro 7.5 MG/0.5ML, Inject 7.5 mg under the skin every 7 days, Disp: , Rfl:     pioglitazone (ACTOS) 30 mg tablet, Take 1 tablet by mouth daily, Disp: " , Rfl:     aspirin (ECOTRIN LOW STRENGTH) 81 mg EC tablet, Take 81 mg by mouth daily (Patient not taking: Reported on 12/20/2024), Disp: , Rfl:     docusate sodium (COLACE) 100 mg capsule, Take 1 capsule (100 mg total) by mouth 2 (two) times a day as needed for constipation (Patient not taking: Reported on 12/20/2024), Disp: , Rfl:     Empagliflozin 25 MG TABS, Take 25 mg by mouth daily (Patient not taking: Reported on 12/20/2024), Disp: , Rfl:     glipiZIDE (GLUCOTROL XL) 5 mg 24 hr tablet, Take 10 mg by mouth daily, Disp: , Rfl:     liraglutide (Victoza) injection, Inject 1.8 mg under the skin daily (Patient not taking: Reported on 12/20/2024), Disp: , Rfl:     meloxicam (MOBIC) 15 mg tablet, Take 15 mg by mouth daily (Patient not taking: Reported on 12/20/2024), Disp: , Rfl:         Frank D'Amico, MD

## 2024-12-20 NOTE — TELEPHONE ENCOUNTER
PA for SILDENAFIL  DENIED    Reason:(Screenshot if applicable)    NOT A COVERED DX    TO BE CONSIDERED MUST BE PULMONARY HYPERTENSION    PT TO USE GOOD RX

## 2025-03-17 NOTE — PLAN OF CARE
Problem: Potential for Falls  Goal: Patient will remain free of falls  Description: INTERVENTIONS:  - Educate patient/family on patient safety including physical limitations  - Instruct patient to ll for assistance with activity   - Consult OT/PT to assist with strengthening/mobility   - Keep Call bell within reach  - Keep bed low and locked with side rails adjusted as appropriate  - Keep care items and personal belongings within reach  - Initiate and maintain comfort rounds  - Make Fall Risk Sign visible to staff  - Offer Toileting every 2 Hours, in advance of need  - Initiate/Maintain bed and chair alarm  - Obtain necessary fall risk management equipment:   - Apply yellow socks and bracelet for high fall risk patients  - Consider moving patient to room near nurses station  Outcome: Progressing     Problem: PAIN - ADULT  Goal: Verbalizes/displays adequate comfort level or baseline comfort level  Description: Interventions:  - Encourage patient to monitor pain and request assistance  - Assess pain using appropriate pain scale  - Administer analgesics based on type and severity of pain and evaluate response  - Implement non-pharmacological measures as appropriate and evaluate response  - Consider cultural and social influences on pain and pain management  - Notify physician/advanced practitioner if interventions unsuccessful or patient reports new pain  Outcome: Progressing     Problem: INFECTION - ADULT  Goal: Absence or prevention of progression during hospitalization  Description: INTERVENTIONS:  - Assess and monitor for signs and symptoms of infection  - Monitor lab/diagnostic results  - Monitor all insertion sites, i.e. indwelling lines, tubes, and drains  - Monitor endotracheal if appropriate and nasal secretions for changes in amount and color  - [place] appropriate cooling/warming therapies per order  - Administer medications as ordered  - Instruct and encourage patient and family to use good hand hygiene  technique  - Identify and instruct in appropriate isolation precautions for identified infection/condition  Outcome: Progressing         Yes

## 2025-06-05 ENCOUNTER — HOSPITAL ENCOUNTER (EMERGENCY)
Facility: HOSPITAL | Age: 55
Discharge: HOME | End: 2025-06-05
Attending: EMERGENCY MEDICINE
Payer: COMMERCIAL

## 2025-06-05 VITALS
RESPIRATION RATE: 16 BRPM | OXYGEN SATURATION: 98 % | HEART RATE: 85 BPM | TEMPERATURE: 97.6 F | DIASTOLIC BLOOD PRESSURE: 81 MMHG | SYSTOLIC BLOOD PRESSURE: 155 MMHG

## 2025-06-05 DIAGNOSIS — T31.0 BURN (ANY DEGREE) INVOLVING LESS THAN 10% OF BODY SURFACE: Primary | ICD-10-CM

## 2025-06-05 PROCEDURE — 99283 EMERGENCY DEPT VISIT LOW MDM: CPT

## 2025-06-05 PROCEDURE — 90471 IMMUNIZATION ADMIN: CPT

## 2025-06-05 PROCEDURE — 99284 EMERGENCY DEPT VISIT MOD MDM: CPT | Performed by: EMERGENCY MEDICINE

## 2025-06-05 PROCEDURE — 90715 TDAP VACCINE 7 YRS/> IM: CPT | Performed by: EMERGENCY MEDICINE

## 2025-06-05 RX ORDER — GINSENG 100 MG
1 CAPSULE ORAL ONCE
Status: COMPLETED | OUTPATIENT
Start: 2025-06-05 | End: 2025-06-05

## 2025-06-05 RX ADMIN — TETANUS TOXOID, REDUCED DIPHTHERIA TOXOID AND ACELLULAR PERTUSSIS VACCINE, ADSORBED 0.5 ML: 5; 2.5; 8; 8; 2.5 SUSPENSION INTRAMUSCULAR at 17:08

## 2025-06-05 RX ADMIN — BACITRACIN 1 LARGE APPLICATION: 500 OINTMENT TOPICAL at 17:07

## 2025-06-05 NOTE — ED PROVIDER NOTES
Time reflects when diagnosis was documented in both MDM as applicable and the Disposition within this note       Time User Action Codes Description Comment    6/5/2025  4:54 PM Mari Friedman [T31.0] Burn (any degree) involving less than 10% of body surface           ED Disposition       ED Disposition   Discharge    Condition   Stable    Date/Time   Thu Jun 5, 2025  4:54 PM    Comment   Nate Todd discharge to home/self care.                   Assessment & Plan       Medical Decision Making  This patient presents with partial-thickness burns of left forearm after touching left forearm to a hot tailpipe accidentally, without evidence of superimposed infection, neurovascular injury, circumferential burns, or other trauma.  Blisters were erupted and debrided.  Bacitracin and sterile dressing applied.  Patient/parents advised continued supportive care including Tylenol or Motrin for pain, wound care, frequent dressing changes, bacitracin.  Advise close follow-up or return if symptoms worsen.  Tetanus shot was updated.      Problems Addressed:  Burn (any degree) involving less than 10% of body surface: acute illness or injury    Risk  OTC drugs.  Prescription drug management.             Medications   tetanus-diphtheria-acellular pertussis (BOOSTRIX) IM injection 0.5 mL (has no administration in time range)   bacitracin topical ointment 1 large application (has no administration in time range)       ED Risk Strat Scores                    No data recorded        SBIRT 20yo+      Flowsheet Row Most Recent Value   Initial Alcohol Screen: US AUDIT-C     1. How often do you have a drink containing alcohol? 0 Filed at: 06/05/2025 1649   2. How many drinks containing alcohol do you have on a typical day you are drinking?  0 Filed at: 06/05/2025 1649   3a. Male UNDER 65: How often do you have five or more drinks on one occasion? 0 Filed at: 06/05/2025 1649   3b. FEMALE Any Age, or MALE 65+: How often do you have 4  or more drinks on one occassion? 0 Filed at: 06/05/2025 1649   Audit-C Score 0 Filed at: 06/05/2025 1649   SARAI: How many times in the past year have you...    Used an illegal drug or used a prescription medication for non-medical reasons? Never Filed at: 06/05/2025 1643                            History of Present Illness       Chief Complaint   Patient presents with    Burn     Pt burnt his left forearm on an exhaust pipe yesterday, reports pain and discharge from same       Past Medical History[1]   Past Surgical History[2]   Family History[3]   Social History[4]   E-Cigarette/Vaping    E-Cigarette Use Never User       E-Cigarette/Vaping Substances    Nicotine No       I have reviewed and agree with the history as documented.     Patient is a 55-year-old male presenting to the emergency department complaining of burn to his left forearm that occurred yesterday after he accidentally touched that forearm to a hot tailpipe while working in his garage, he denies pain or injury elsewhere, he reports some yellow oozing from the wound, last tetanus shot unknown, denies pain or injury elsewhere        Review of Systems   Constitutional: Negative.    HENT: Negative.     Eyes: Negative.    Respiratory: Negative.     Cardiovascular: Negative.    Gastrointestinal: Negative.    Endocrine: Negative.    Genitourinary: Negative.    Musculoskeletal: Negative.    Skin:  Positive for wound.   Allergic/Immunologic: Negative.    Neurological: Negative.    Hematological: Negative.    Psychiatric/Behavioral: Negative.             Objective       ED Triage Vitals [06/05/25 1624]   Temperature Pulse Blood Pressure Respirations SpO2 Patient Position - Orthostatic VS   97.6 °F (36.4 °C) 85 155/81 16 98 % Sitting      Temp Source Heart Rate Source BP Location FiO2 (%) Pain Score    Temporal Monitor Right arm -- 2      Vitals      Date and Time Temp Pulse SpO2 Resp BP Pain Score FACES Pain Rating User   06/05/25 1624 97.6 °F (36.4 °C) 85 98  % 16 155/81 2 -- AS            Physical Exam  Constitutional:       Appearance: He is well-developed.   HENT:      Head: Normocephalic and atraumatic.     Eyes:      Conjunctiva/sclera: Conjunctivae normal.      Pupils: Pupils are equal, round, and reactive to light.       Cardiovascular:      Rate and Rhythm: Normal rate.   Pulmonary:      Effort: Pulmonary effort is normal.   Abdominal:      Palpations: Abdomen is soft.     Musculoskeletal:         General: Normal range of motion.      Cervical back: Normal range of motion and neck supple.     Skin:     General: Skin is warm and dry.           Comments: Wound to the left forearm consistent with debrided burn, mild serous drainage, no purulent drainage, no bony deformity, distal sensation and motor is intact, 2+ radial pulses, see photo     Neurological:      Mental Status: He is alert and oriented to person, place, and time.         Results Reviewed       None            No orders to display       Procedures    ED Medication and Procedure Management   Prior to Admission Medications   Prescriptions Last Dose Informant Patient Reported? Taking?   Canagliflozin (Invokana) 300 MG TABS   Yes No   Sig: Take 300 mg by mouth daily   Droplet Pen Needles 32G X 4 MM MISC   Yes No   Sig: use 1 PEN NEEDLE to inject MEDICATION subcutaneously once daily   Empagliflozin 25 MG TABS   Yes No   Sig: Take 25 mg by mouth daily   Patient not taking: Reported on 12/20/2024   Kerendia 10 MG TABS   Yes No   Sig: Take 1 tablet by mouth daily   Kerendia 20 MG TABS   Yes No   Sig: Take 20 mg by mouth daily in the early morning   Mounjaro 7.5 MG/0.5ML   Yes No   Sig: Inject 7.5 mg under the skin every 7 days   amLODIPine (NORVASC) 10 mg tablet   Yes No   Sig: Take 1 tablet by mouth daily   aspirin (ECOTRIN LOW STRENGTH) 81 mg EC tablet   Yes No   Sig: Take 81 mg by mouth daily   Patient not taking: Reported on 12/20/2024   atorvastatin (Lipitor) 10 mg tablet   Yes No   Sig: Take 10 mg by  mouth daily at bedtime   docusate sodium (COLACE) 100 mg capsule   No No   Sig: Take 1 capsule (100 mg total) by mouth 2 (two) times a day as needed for constipation   Patient not taking: Reported on 12/20/2024   glipiZIDE (GLUCOTROL XL) 5 mg 24 hr tablet   Yes No   Sig: Take 10 mg by mouth daily   glipiZIDE (GLUCOTROL XL) 5 mg 24 hr tablet   Yes No   Sig: Take 5 mg by mouth daily after dinner   Patient taking differently: Take 5 mg by mouth 2 (two) times a day   liraglutide (Victoza) injection   Yes No   Sig: Inject 1.8 mg under the skin daily   Patient not taking: Reported on 12/20/2024   losartan (COZAAR) 100 MG tablet   Yes No   Sig: Take 100 mg by mouth daily   meloxicam (MOBIC) 15 mg tablet   Yes No   Sig: Take 15 mg by mouth daily   Patient not taking: Reported on 12/20/2024   metFORMIN (GLUCOPHAGE) 1000 MG tablet   Yes No   Sig: Take 1 tablet by mouth 2 (two) times a day with meals   pioglitazone (ACTOS) 30 mg tablet   Yes No   Sig: Take 1 tablet by mouth daily   sildenafil (REVATIO) 20 mg tablet   No No   Sig: Take anywhere from 1 to 5 tablets but no more than 5 tablets in a 24-hour period.  Do not drink alcohol with this product and take on empty stomach or wait 2 hours after eating to take the medication.      Facility-Administered Medications: None     Patient's Medications   Discharge Prescriptions    No medications on file     No discharge procedures on file.  ED SEPSIS DOCUMENTATION   Time reflects when diagnosis was documented in both MDM as applicable and the Disposition within this note       Time User Action Codes Description Comment    6/5/2025  4:54 PM Mari Friedman Add [T31.0] Burn (any degree) involving less than 10% of body surface                      [1]   Past Medical History:  Diagnosis Date    Chronic kidney disease     Diabetes mellitus (HCC)     Diverticulitis     High cholesterol     Hypertension    [2]   Past Surgical History:  Procedure Laterality Date    ABDOMINAL AORTIC ANEURYSM  REPAIR      ADENOIDECTOMY      CHOLECYSTECTOMY      COLON SIGMOID RESECTION      COLOSTOMY      HERNIA REPAIR      REVISION COLOSTOMY      TONSILLECTOMY     [3]   Family History  Problem Relation Name Age of Onset    Heart disease Mother      Aneurysm Mother      Irregular heart beat Mother      Heart disease Father     [4]   Social History  Tobacco Use    Smoking status: Every Day     Current packs/day: 1.00     Types: Cigarettes     Passive exposure: Current    Smokeless tobacco: Never    Tobacco comments:     Smoked for past 30 years   Vaping Use    Vaping status: Never Used   Substance Use Topics    Alcohol use: Not Currently    Drug use: Not Currently        Mari Friedman DO  06/05/25 [id number]

## 2025-07-11 NOTE — ASSESSMENT & PLAN NOTE
Presenting with worsening perirectal abscess over the past two days. Was placed on bactrim outpatient by urgent care without improvement   I&D by surgery at bedside on 3/14   Continue zosyn   Monitor vital signs and fever curve   Monitor blood cultures and would cultures   Pain management  Rest per primary    No psychiatric contraindications to discharge